# Patient Record
Sex: FEMALE | Race: BLACK OR AFRICAN AMERICAN | Employment: UNEMPLOYED | URBAN - METROPOLITAN AREA
[De-identification: names, ages, dates, MRNs, and addresses within clinical notes are randomized per-mention and may not be internally consistent; named-entity substitution may affect disease eponyms.]

---

## 2022-11-08 ENCOUNTER — APPOINTMENT (OUTPATIENT)
Dept: GENERAL RADIOLOGY | Age: 32
End: 2022-11-08
Payer: COMMERCIAL

## 2022-11-08 ENCOUNTER — HOSPITAL ENCOUNTER (EMERGENCY)
Age: 32
Discharge: HOME | End: 2022-11-08
Attending: EMERGENCY MEDICINE
Payer: COMMERCIAL

## 2022-11-08 VITALS
WEIGHT: 260 LBS | OXYGEN SATURATION: 98 % | SYSTOLIC BLOOD PRESSURE: 117 MMHG | HEIGHT: 64 IN | RESPIRATION RATE: 17 BRPM | HEART RATE: 90 BPM | BODY MASS INDEX: 44.39 KG/M2 | TEMPERATURE: 98.2 F | DIASTOLIC BLOOD PRESSURE: 79 MMHG

## 2022-11-08 DIAGNOSIS — T73.3XXA FATIGUE DUE TO EXCESSIVE EXERTION, INITIAL ENCOUNTER: Primary | ICD-10-CM

## 2022-11-08 LAB
ABSOLUTE EOS #: 0.23 K/UL (ref 0–0.44)
ABSOLUTE IMMATURE GRANULOCYTE: 0 K/UL (ref 0–0.3)
ABSOLUTE LYMPH #: 1.62 K/UL (ref 1.1–3.7)
ABSOLUTE MONO #: 0.38 K/UL (ref 0.1–1.2)
ANION GAP SERPL CALCULATED.3IONS-SCNC: 9 MMOL/L (ref 9–17)
BACTERIA: ABNORMAL
BASOPHILS # BLD: 1 % (ref 0–2)
BASOPHILS ABSOLUTE: <0.03 K/UL (ref 0–0.2)
BILIRUBIN URINE: NEGATIVE
BUN BLDV-MCNC: 6 MG/DL (ref 6–20)
BUN/CREAT BLD: 11 (ref 9–20)
CALCIUM SERPL-MCNC: 8.7 MG/DL (ref 8.6–10.4)
CHLORIDE BLD-SCNC: 102 MMOL/L (ref 98–107)
CO2: 25 MMOL/L (ref 20–31)
COLOR: YELLOW
CREAT SERPL-MCNC: 0.57 MG/DL (ref 0.5–0.9)
EKG ATRIAL RATE: 88 BPM
EKG P AXIS: 43 DEGREES
EKG P-R INTERVAL: 164 MS
EKG Q-T INTERVAL: 370 MS
EKG QRS DURATION: 80 MS
EKG QTC CALCULATION (BAZETT): 447 MS
EKG R AXIS: 27 DEGREES
EKG T AXIS: 20 DEGREES
EKG VENTRICULAR RATE: 88 BPM
EOSINOPHILS RELATIVE PERCENT: 7 % (ref 1–4)
EPITHELIAL CELLS UA: ABNORMAL /HPF (ref 0–5)
GFR SERPL CREATININE-BSD FRML MDRD: >60 ML/MIN/1.73M2
GLUCOSE BLD-MCNC: 89 MG/DL (ref 70–99)
GLUCOSE URINE: NEGATIVE
HCT VFR BLD CALC: 41.3 % (ref 36.3–47.1)
HEMOGLOBIN: 12.5 G/DL (ref 11.9–15.1)
IMMATURE GRANULOCYTES: 0 %
KETONES, URINE: NEGATIVE
LEUKOCYTE ESTERASE, URINE: NEGATIVE
LYMPHOCYTES # BLD: 45 % (ref 24–43)
MCH RBC QN AUTO: 25.8 PG (ref 25.2–33.5)
MCHC RBC AUTO-ENTMCNC: 30.3 G/DL (ref 28.4–34.8)
MCV RBC AUTO: 85.2 FL (ref 82.6–102.9)
MONOCYTES # BLD: 11 % (ref 3–12)
NITRITE, URINE: NEGATIVE
NRBC AUTOMATED: 0 PER 100 WBC
PDW BLD-RTO: 15.2 % (ref 11.8–14.4)
PH UA: 7 (ref 5–8)
PLATELET # BLD: 381 K/UL (ref 138–453)
PMV BLD AUTO: 10.6 FL (ref 8.1–13.5)
POTASSIUM SERPL-SCNC: 3.9 MMOL/L (ref 3.7–5.3)
PROTEIN UA: ABNORMAL
RBC # BLD: 4.85 M/UL (ref 3.95–5.11)
RBC # BLD: ABNORMAL 10*6/UL
RBC UA: ABNORMAL /HPF (ref 0–2)
REASON FOR REJECTION: NORMAL
SARS-COV-2, RAPID: NOT DETECTED
SEG NEUTROPHILS: 36 % (ref 36–65)
SEGMENTED NEUTROPHILS ABSOLUTE COUNT: 1.28 K/UL (ref 1.5–8.1)
SODIUM BLD-SCNC: 136 MMOL/L (ref 135–144)
SPECIFIC GRAVITY UA: 1.02 (ref 1–1.03)
SPECIMEN DESCRIPTION: NORMAL
TROPONIN, HIGH SENSITIVITY: <6 NG/L (ref 0–14)
TURBIDITY: CLEAR
URINE HGB: ABNORMAL
UROBILINOGEN, URINE: NORMAL
WBC # BLD: 3.5 K/UL (ref 3.5–11.3)
WBC UA: ABNORMAL /HPF (ref 0–5)
ZZ NTE CLEAN UP: ORDERED TEST: NORMAL
ZZ NTE WITH NAME CLEAN UP: SPECIMEN SOURCE: NORMAL

## 2022-11-08 PROCEDURE — 84484 ASSAY OF TROPONIN QUANT: CPT

## 2022-11-08 PROCEDURE — 87635 SARS-COV-2 COVID-19 AMP PRB: CPT

## 2022-11-08 PROCEDURE — 85025 COMPLETE CBC W/AUTO DIFF WBC: CPT

## 2022-11-08 PROCEDURE — 80048 BASIC METABOLIC PNL TOTAL CA: CPT

## 2022-11-08 PROCEDURE — 2580000003 HC RX 258: Performed by: EMERGENCY MEDICINE

## 2022-11-08 PROCEDURE — 71045 X-RAY EXAM CHEST 1 VIEW: CPT

## 2022-11-08 PROCEDURE — 81001 URINALYSIS AUTO W/SCOPE: CPT

## 2022-11-08 PROCEDURE — 93005 ELECTROCARDIOGRAM TRACING: CPT | Performed by: STUDENT IN AN ORGANIZED HEALTH CARE EDUCATION/TRAINING PROGRAM

## 2022-11-08 PROCEDURE — 99285 EMERGENCY DEPT VISIT HI MDM: CPT

## 2022-11-08 RX ORDER — 0.9 % SODIUM CHLORIDE 0.9 %
500 INTRAVENOUS SOLUTION INTRAVENOUS ONCE
Status: COMPLETED | OUTPATIENT
Start: 2022-11-08 | End: 2022-11-08

## 2022-11-08 RX ORDER — PREDNISONE 20 MG/1
TABLET ORAL
Qty: 14 TABLET | Refills: 0 | Status: SHIPPED | OUTPATIENT
Start: 2022-11-08 | End: 2022-11-20

## 2022-11-08 RX ADMIN — SODIUM CHLORIDE 500 ML: 9 INJECTION, SOLUTION INTRAVENOUS at 12:36

## 2022-11-08 ASSESSMENT — ENCOUNTER SYMPTOMS
DIARRHEA: 0
SHORTNESS OF BREATH: 1
ABDOMINAL PAIN: 0
COUGH: 1
SORE THROAT: 1
NAUSEA: 0
RHINORRHEA: 1

## 2022-11-08 ASSESSMENT — PAIN - FUNCTIONAL ASSESSMENT: PAIN_FUNCTIONAL_ASSESSMENT: 0-10

## 2022-11-08 ASSESSMENT — PAIN DESCRIPTION - LOCATION: LOCATION: GENERALIZED

## 2022-11-08 NOTE — ED NOTES
Writer [facility] iv fluids running      [facility]  11/08/22 9773

## 2022-11-08 NOTE — ED PROVIDER NOTES
[facility] ED  Emergency Department Encounter  Resident     Pt Candace Jeter  MRN: [id number]  [place] 1990  Date of evaluation: 11/8/22  PCP:  No primary care provider on file. CHIEF COMPLAINT       Chief Complaint   Patient presents with    Lupus     Pt states that she is having a lupus flare up. States that she knows when she is going to have a flare        HISTORY OF PRESENT ILLNESS  (Location/Symptom, Timing/Onset, Context/Setting, Quality, Duration, Modifying Factors, Severity.)      Stephanie Roldan is a 27 y.o. female who presents with with complaint of fatigue, fever (102 at home), chills, runny nose, sore throat, cough productive of yellow sputum. Denies dizziness, headache, chest pain, nausea, vomiting, diarrhea or change in urine. Patient has history of SLE, sjogren syndrome and venous sinus thrombosis. On plaqenil, cellcept and aspirin. She has not been following up with doctors for over a year, she has an appointment with a new pcp next week. PAST MEDICAL / SURGICAL / SOCIAL / FAMILY HISTORY      has no past medical history on file. has no past surgical history on file.     Social History     Socioeconomic History    Marital status: Single     Spouse name: Not on file    Number of children: Not on file    Years of education: Not on file    Highest education level: Not on file   Occupational History    Not on file   Tobacco Use    Smoking status: Not on file    Smokeless tobacco: Not on file   Substance and Sexual Activity    Alcohol use: Not on file    Drug use: Not on file    Sexual activity: Not on file   Other Topics Concern    Not on file   Social History Narrative    Not on file     Social Determinants of Health     Financial Resource Strain: Not on file   Food Insecurity: Not on file   Transportation Needs: Not on file   Physical Activity: Not on file   Stress: Not on file   Social Connections: Not on file   Intimate Partner Violence: Not on file   Housing Stability: Not on file       No family history on file. Allergies:  Codeine    Home Medications:  Prior to Admission medications    Medication Sig Start Date End Date Taking? Authorizing Provider   predniSONE (DELTASONE) 20 MG tablet Take 2 tablets by mouth daily for 4 days, THEN 1 tablet daily for 4 days, THEN 0.5 tablets daily for 4 days. 11/8/22 11/20/22 Yes Carlitos Shook MD       REVIEW OF SYSTEMS    (2-9 systems for level 4, 10 or more for level 5)      Review of Systems   Constitutional:  Positive for activity change, chills, fatigue and fever. HENT:  Positive for rhinorrhea and sore throat. Respiratory:  Positive for cough and shortness of breath. Cardiovascular:  Negative for chest pain and palpitations. Gastrointestinal:  Negative for abdominal pain, diarrhea and nausea. Genitourinary:  Negative for difficulty urinating. Musculoskeletal:  Positive for arthralgias. Neurological:  Negative for dizziness, weakness, light-headedness and numbness. PHYSICAL EXAM   (up to 7 for level 4, 8 or more for level 5)      INITIAL VITALS:   /79   Pulse 90   Temp 98.2 °F (36.8 °C) (Oral)   Resp 17   Ht 5' 4\" (1.626 m)   Wt 260 lb (117.9 kg)   LMP 10/08/2022   SpO2 98%   BMI 44.63 kg/m²     Physical Exam  Vitals and nursing note reviewed. Constitutional:       General: She is not in acute distress. Appearance: Normal appearance. She is not ill-appearing. HENT:      Head: Normocephalic and atraumatic. Mouth/Throat:      Pharynx: Oropharynx is clear. No oropharyngeal exudate. Cardiovascular:      Rate and Rhythm: Normal rate and regular rhythm. Pulses: Normal pulses. Heart sounds: No murmur heard. Pulmonary:      Effort: Pulmonary effort is normal.      Breath sounds: Normal breath sounds. Abdominal:      Palpations: Abdomen is soft. There is no mass. Tenderness: There is no abdominal tenderness. Neurological:      General: No focal deficit present. Mental Status: She is alert and oriented to person, place, and time. PLAN (LABS / IMAGING / EKG):  Orders Placed This Encounter   Procedures    COVID-19, Rapid    XR CHEST PORTABLE    CBC with Auto Differential    Urinalysis    SPECIMEN REJECTION    Basic Metabolic Panel    PREVIOUS SPECIMEN    Troponin    Microscopic Urinalysis    EKG 12 Lead       MEDICATIONS ORDERED:  Orders Placed This Encounter   Medications    0.9 % sodium chloride bolus    predniSONE (DELTASONE) 20 MG tablet     Sig: Take 2 tablets by mouth daily for 4 days, THEN 1 tablet daily for 4 days, THEN 0.5 tablets daily for 4 days. Dispense:  14 tablet     Refill:  0         DIAGNOSTIC RESULTS / EMERGENCY DEPARTMENT COURSE / MDM   :  Results for orders placed or performed during the hospital encounter of 11/08/22   COVID-19, Rapid    Specimen: Nasopharyngeal Swab   Result Value Ref Range    Specimen Description . NASOPHARYNGEAL SWAB     SARS-CoV-2, Rapid Not Detected Not Detected   CBC with Auto Differential   Result Value Ref Range    WBC 3.5 3.5 - 11.3 k/uL    RBC 4.85 3.95 - 5.11 m/uL    Hemoglobin 12.5 11.9 - 15.1 g/dL    Hematocrit 41.3 36.3 - 47.1 %    MCV 85.2 82.6 - 102.9 fL    MCH 25.8 25.2 - 33.5 pg    MCHC 30.3 28.4 - 34.8 g/dL    RDW 15.2 (H) 11.8 - 14.4 %    Platelets 678 353 - 751 k/uL    MPV 10.6 8.1 - 13.5 fL    NRBC Automated 0.0 0.0 per 100 WBC    Seg Neutrophils 36 36 - 65 %    Lymphocytes 45 (H) 24 - 43 %    Monocytes 11 3 - 12 %    Eosinophils % 7 (H) 1 - 4 %    Basophils 1 0 - 2 %    Immature Granulocytes 0 0 %    Segs Absolute 1.28 (L) 1.50 - 8.10 k/uL    Absolute Lymph # 1.62 1.10 - 3.70 k/uL    Absolute Mono # 0.38 0.10 - 1.20 k/uL    Absolute Eos # 0.23 0.00 - 0.44 k/uL    Basophils Absolute <0.03 0.00 - 0.20 k/uL    Absolute Immature Granulocyte 0.00 0.00 - 0.30 k/uL    RBC Morphology ANISOCYTOSIS PRESENT    Urinalysis   Result Value Ref Range    Color, UA Yellow Yellow    Turbidity UA Clear Clear    Glucose, Ur NEGATIVE NEGATIVE    Bilirubin Urine NEGATIVE NEGATIVE    Ketones, Urine NEGATIVE NEGATIVE    Specific Gravity, UA 1.025 1.005 - 1.030    Urine Hgb 1+ (A) NEGATIVE    pH, UA 7.0 5.0 - 8.0    Protein, UA 3+ (A) NEGATIVE    Urobilinogen, Urine Normal Normal    Nitrite, Urine NEGATIVE NEGATIVE    Leukocyte Esterase, Urine NEGATIVE NEGATIVE   SPECIMEN REJECTION   Result Value Ref Range    Specimen Source . BLOOD     Ordered Test BMP,TROPI     Reason for Rejection Unable to perform testing: Specimen hemolyzed. Basic Metabolic Panel   Result Value Ref Range    Glucose 89 70 - 99 mg/dL    BUN 6 6 - 20 mg/dL    Creatinine 0.57 0.50 - 0.90 mg/dL    Est, Glom Filt Rate >60 >60 mL/min/1.73m2    Bun/Cre Ratio 11 9 - 20    Calcium 8.7 8.6 - 10.4 mg/dL    Sodium 136 135 - 144 mmol/L    Potassium 3.9 3.7 - 5.3 mmol/L    Chloride 102 98 - 107 mmol/L    CO2 25 20 - 31 mmol/L    Anion Gap 9 9 - 17 mmol/L   Troponin   Result Value Ref Range    Troponin, High Sensitivity <6 0 - 14 ng/L   Microscopic Urinalysis   Result Value Ref Range    WBC, UA None 0 - 5 /HPF    RBC, UA 2 TO 5 0 - 2 /HPF    Epithelial Cells UA 2 TO 5 0 - 5 /HPF    Bacteria, UA RARE (A) None   EKG 12 Lead   Result Value Ref Range    Ventricular Rate 88 BPM    Atrial Rate 88 BPM    P-R Interval 164 ms    QRS Duration 80 ms    Q-T Interval 370 ms    QTc Calculation (Bazett) 447 ms    P Axis 43 degrees    R Axis 27 degrees    T Axis 20 degrees       IMPRESSION:  27year-old female ith PMHx of SLE, Sjogren syndrome, venous sinus thrombosis who presents with fatigue, rhinorrhea, chills, cough and sore throat. Covid test was negative. CXR unremarkable. Workup unremarkable. Patient improved after 500ml bolus of IV. Discharged patient on steroid taper with close follow up with PCP.        RADIOLOGY:  None    EKG  None    All EKG's are interpreted by the Emergency Department Physician who either signs or Co-signs this chart in the absence of a cardiologist.    EMERGENCY DEPARTMENT COURSE:         PROCEDURES:  None    CONSULTS:  None    CRITICAL CARE:  None    FINAL IMPRESSION      1. Fatigue due to excessive exertion, initial encounter          DISPOSITION / PLAN     DISPOSITION Decision To Discharge 11/08/2022 02:12:21 PM      PATIENT REFERRED TO:  follow-up with PCP. DISCHARGE MEDICATIONS:  Discharge Medication List as of 11/8/2022  2:02 PM        START taking these medications    Details   predniSONE (DELTASONE) 20 MG tablet Take 2 tablets by mouth daily for 4 days, THEN 1 tablet daily for 4 days, THEN 0.5 tablets daily for 4 days. , Disp-14 tablet, R-0Normal             Melissa Lang MD  PGY3 Family Medicine Resident    (Please note that portions of thisnote were completed with a voice recognition program.  Efforts were made to edit the dictations but occasionally words are mis-transcribed.)

## 2022-11-08 NOTE — ED NOTES
Patient up to use the bathroom at this time  Gait is steady and even     Davida Mercado RN  11/08/22 1893

## 2022-11-08 NOTE — ED PROVIDER NOTES
EMERGENCY DEPARTMENT ENCOUNTER   ATTENDING ATTESTATION     Pt Name: Dannielle Mann  MRN: [id number]  [place] 1990  Date of evaluation: 11/8/22       Dannielle Mann is a 27 y.o. female who presents with Lupus (Pt states that she is having a lupus flare up. States that she knows when she is going to have a flare )      MDM:   Alert oriented nondistressed 20-year-old female presenting to the emergency room for generalized fatigue malaise and body aches. Patient has overall unremarkable physical exam.  She has normal SPO2 normal cardiopulmonary exam.  Labs are within acceptable limits. Patient did request IV fluids and plan is for steroid taper for home for possible lupus exacerbation. Vitals:   Vitals:    11/08/22 0942   BP: (!) 146/90   Pulse: (!) 103   Resp: 18   Temp: 98.2 °F (36.8 °C)   TempSrc: Oral   SpO2: 98%   Weight: 260 lb (117.9 kg)   Height: 5' 4\" (1.626 m)     EKG sinus rhythm  Motion artifact present affecting interpretation  Ventricular rate 88  No STEMI criteria    QTc 447    I personally saw and examined the patient. I have reviewed and agree with the resident's findings, including all diagnostic interpretations and treatment plan as written. I was present for the key portions of any procedures performed and the inclusive time noted for any critical care statement. The care is provided during an unprecedented national emergency due to the novel coronavirus, COVID 19.   Delmer Iyer MD  Attending Emergency Physician           Keara Smith MD  11/08/22 1138       Keara Smith MD  11/08/22 1229       Keara Smith MD  11/08/22 1233

## 2022-11-16 ENCOUNTER — OFFICE VISIT (OUTPATIENT)
Dept: FAMILY MEDICINE CLINIC | Age: 32
End: 2022-11-16
Payer: COMMERCIAL

## 2022-11-16 VITALS
SYSTOLIC BLOOD PRESSURE: 144 MMHG | OXYGEN SATURATION: 99 % | HEART RATE: 77 BPM | DIASTOLIC BLOOD PRESSURE: 103 MMHG | BODY MASS INDEX: 45.38 KG/M2 | WEIGHT: 264.4 LBS

## 2022-11-16 DIAGNOSIS — Z76.0 MEDICATION REFILL: ICD-10-CM

## 2022-11-16 DIAGNOSIS — G89.29 CHRONIC MIDLINE LOW BACK PAIN WITHOUT SCIATICA: ICD-10-CM

## 2022-11-16 DIAGNOSIS — F43.10 PTSD (POST-TRAUMATIC STRESS DISORDER): ICD-10-CM

## 2022-11-16 DIAGNOSIS — E55.9 VITAMIN D DEFICIENCY: ICD-10-CM

## 2022-11-16 DIAGNOSIS — M54.50 CHRONIC MIDLINE LOW BACK PAIN WITHOUT SCIATICA: ICD-10-CM

## 2022-11-16 DIAGNOSIS — M35.02 SJOGREN'S SYNDROME WITH LUNG INVOLVEMENT (HCC): ICD-10-CM

## 2022-11-16 DIAGNOSIS — Z76.89 ENCOUNTER TO ESTABLISH CARE: Primary | ICD-10-CM

## 2022-11-16 DIAGNOSIS — R53.83 OTHER FATIGUE: ICD-10-CM

## 2022-11-16 DIAGNOSIS — M32.9 SLE (SYSTEMIC LUPUS ERYTHEMATOSUS RELATED SYNDROME) (HCC): ICD-10-CM

## 2022-11-16 DIAGNOSIS — M79.7 FIBROMYALGIA: ICD-10-CM

## 2022-11-16 PROBLEM — R56.9 SEIZURES (HCC): Status: ACTIVE | Noted: 2020-12-09

## 2022-11-16 PROBLEM — G93.9 LESION OF LEFT FRONTAL LOBE OF BRAIN: Status: ACTIVE | Noted: 2020-12-06

## 2022-11-16 PROBLEM — D63.8 ANEMIA OF CHRONIC DISEASE: Status: ACTIVE | Noted: 2020-12-09

## 2022-11-16 PROBLEM — G08 DURAL VENOUS SINUS THROMBOSIS: Status: ACTIVE | Noted: 2020-12-11

## 2022-11-16 PROCEDURE — G8427 DOCREV CUR MEDS BY ELIG CLIN: HCPCS | Performed by: NURSE PRACTITIONER

## 2022-11-16 PROCEDURE — G8417 CALC BMI ABV UP PARAM F/U: HCPCS | Performed by: NURSE PRACTITIONER

## 2022-11-16 PROCEDURE — 99204 OFFICE O/P NEW MOD 45 MIN: CPT | Performed by: NURSE PRACTITIONER

## 2022-11-16 PROCEDURE — 4004F PT TOBACCO SCREEN RCVD TLK: CPT | Performed by: NURSE PRACTITIONER

## 2022-11-16 PROCEDURE — G8484 FLU IMMUNIZE NO ADMIN: HCPCS | Performed by: NURSE PRACTITIONER

## 2022-11-16 RX ORDER — FERROUS SULFATE 324(65)MG
TABLET, DELAYED RELEASE (ENTERIC COATED) ORAL
COMMUNITY
Start: 2022-03-24 | End: 2022-11-16 | Stop reason: SDUPTHER

## 2022-11-16 RX ORDER — LEVETIRACETAM 750 MG/1
750 TABLET ORAL 2 TIMES DAILY
COMMUNITY
Start: 2021-01-15

## 2022-11-16 RX ORDER — HYDROXYCHLOROQUINE SULFATE 200 MG/1
200 TABLET, FILM COATED ORAL 2 TIMES DAILY
Qty: 60 TABLET | Refills: 1 | Status: SHIPPED | OUTPATIENT
Start: 2022-11-16

## 2022-11-16 RX ORDER — HYDROXYCHLOROQUINE SULFATE 200 MG/1
400 TABLET, FILM COATED ORAL DAILY
COMMUNITY
Start: 2021-03-01 | End: 2022-11-16 | Stop reason: SDUPTHER

## 2022-11-16 RX ORDER — CETIRIZINE HYDROCHLORIDE 10 MG/1
10 TABLET ORAL DAILY PRN
Qty: 90 TABLET | Refills: 1 | Status: SHIPPED | OUTPATIENT
Start: 2022-11-16

## 2022-11-16 RX ORDER — FERROUS SULFATE 324(65)MG
TABLET, DELAYED RELEASE (ENTERIC COATED) ORAL
Qty: 90 TABLET | Refills: 1 | Status: SHIPPED | OUTPATIENT
Start: 2022-11-16

## 2022-11-16 RX ORDER — LOSARTAN POTASSIUM 25 MG/1
25 TABLET ORAL DAILY
Qty: 90 TABLET | Refills: 1 | Status: SHIPPED | OUTPATIENT
Start: 2022-11-16

## 2022-11-16 RX ORDER — LOSARTAN POTASSIUM 25 MG/1
25 TABLET ORAL DAILY
COMMUNITY
Start: 2021-04-27 | End: 2022-11-16 | Stop reason: SDUPTHER

## 2022-11-16 RX ORDER — MYCOPHENOLATE MOFETIL 500 MG/1
1000 TABLET ORAL 3 TIMES DAILY
COMMUNITY
Start: 2021-11-23 | End: 2022-11-23

## 2022-11-16 RX ORDER — CETIRIZINE HYDROCHLORIDE 10 MG/1
10 TABLET ORAL DAILY PRN
COMMUNITY
Start: 2021-11-11 | End: 2022-11-16 | Stop reason: SDUPTHER

## 2022-11-16 RX ORDER — PREDNISONE 10 MG/1
10 TABLET ORAL DAILY
Qty: 5 TABLET | Refills: 0 | Status: SHIPPED | OUTPATIENT
Start: 2022-11-16 | End: 2022-11-21

## 2022-11-16 SDOH — ECONOMIC STABILITY: FOOD INSECURITY: WITHIN THE PAST 12 MONTHS, THE FOOD YOU BOUGHT JUST DIDN'T LAST AND YOU DIDN'T HAVE MONEY TO GET MORE.: NEVER TRUE

## 2022-11-16 SDOH — ECONOMIC STABILITY: FOOD INSECURITY: WITHIN THE PAST 12 MONTHS, YOU WORRIED THAT YOUR FOOD WOULD RUN OUT BEFORE YOU GOT MONEY TO BUY MORE.: NEVER TRUE

## 2022-11-16 ASSESSMENT — ENCOUNTER SYMPTOMS
SINUS PRESSURE: 0
SHORTNESS OF BREATH: 0
CHEST TIGHTNESS: 0
ABDOMINAL PAIN: 0
SINUS PAIN: 0
COLOR CHANGE: 0
CONSTIPATION: 0
DIARRHEA: 0

## 2022-11-16 ASSESSMENT — SOCIAL DETERMINANTS OF HEALTH (SDOH): HOW HARD IS IT FOR YOU TO PAY FOR THE VERY BASICS LIKE FOOD, HOUSING, MEDICAL CARE, AND HEATING?: NOT HARD AT ALL

## 2022-11-16 ASSESSMENT — PATIENT HEALTH QUESTIONNAIRE - PHQ9
SUM OF ALL RESPONSES TO PHQ QUESTIONS 1-9: 0
SUM OF ALL RESPONSES TO PHQ QUESTIONS 1-9: 0
2. FEELING DOWN, DEPRESSED OR HOPELESS: 0
SUM OF ALL RESPONSES TO PHQ QUESTIONS 1-9: 0
1. LITTLE INTEREST OR PLEASURE IN DOING THINGS: 0
SUM OF ALL RESPONSES TO PHQ QUESTIONS 1-9: 0
SUM OF ALL RESPONSES TO PHQ9 QUESTIONS 1 & 2: 0

## 2022-11-16 NOTE — PROGRESS NOTES
Jerry Austin is a 27 y.o. female who presents in office today with Self establish new care with office. Previous PCP was Dr Tarsha Hdez @ [address] Complaint   Patient presents with    Establish Care        History of Present Illness:     HPI    Here to establish care. Has lupus and Sjogrens syndrome, established with Luke Hernade[facility]. Had CVA in , lifeflighted to [facility]. Had lesion on blood clot on brain. Has been in trauma therapy since , PTSD. Prefers to remain off medication if able to manage with therapy. Just assigned to new therapist through [facility]. Therapy \"the best thing she's ever done for herself. \"  Had been carrying a lot of \"heavy things. \" Estranged from family, had toxic relationship with parents. Very supportive relationship with wife. Has  also. Blood pressure elevated today. Reports did take losartan today. Attributes to anxiety with new office. BP normal at ER 22 - 117/79. Seen at [facility] for fatigue. Feeling better but wondering if needing a few additional days of prednisone taper as she is just beginning to feel herself again. Would like referral to physical therapy. Most pain in legs and lower back. Aware that weight is contributing. Has been intermittent fasting with 8 hour eating window.        Specialists - needing new referrals  Rheumatology - Roxanna Osorio - 3/30/21  Ophthalmology  Dermatology - [facility]  Nephrology Jenniffer Loredo - 21  Neurology - Aissatou Ott - 3/8/22  Sanjay Moya - 21  Pulmonology - [facility] Dr Arleth Richardson - 3/18/21      Care gaps: COVID-19 vaccine:   no  Colon CA screening:   n/a  Vaccines:   due   Hepatitis C/HIV screens:   ,   Breast CA screening:   n/a  OB/GYN, cervical CA screening:    due - plans to schedule   Depression Screenin    Health Maintenance Due   Topic Date Due    COVID-19 Vaccine (1) Never done    Varicella vaccine (1 of 2 - 2-dose childhood series) Never done    Pneumococcal 0-64 years Vaccine (1 - PCV) Never done    DTaP/Tdap/Td vaccine (1 - Tdap) Never done    Cervical cancer screen  Never done    Flu vaccine (1) Never done        Patient Care Team:  SWATI Guardado - CNP as PCP - General (Nurse Practitioner)    Reviewed     [x] Past Medical, Family, and Social History was reviewed per writer and does contribute to the patient presenting condition. [x] Laboratory Results, Vital signs, Imaging, Active Problems, Immunizations, Current/Recently Discontinued Medications, Health Maintenance Activities Due, Referral Notes (if available) were reviewed per writer     [x] Reviewed Depression screening if taken or valid today or any other valid screening tool (others seen below) Interpretation of Total Score DepressionSeverity: 1-4 = Minimal depression, 5-9 = Mild depression, 10-14 = Moderate depression, 15-19 = Moderately severe depression, 20-27 =Severe depression    PHQ Scores 11/16/2022   PHQ2 Score 0   PHQ9 Score 0     Interpretation of Total Score Depression Severity: 1-4 = Minimal depression, 5-9 = Mild depression, 10-14 = Moderate depression, 15-19 = Moderately severe depression, 20-27 = Severe depression     Review of Systems (Subjective)     Review of Systems   Constitutional:  Positive for fatigue. Negative for activity change, appetite change and unexpected weight change. HENT:  Negative for congestion, sinus pressure and sinus pain. Respiratory:  Negative for chest tightness and shortness of breath. Cardiovascular:  Negative for chest pain, palpitations and leg swelling. Gastrointestinal:  Negative for abdominal pain, constipation and diarrhea. Genitourinary:  Negative for difficulty urinating and dysuria. Musculoskeletal:  Negative for arthralgias and myalgias. Skin:  Negative for color change and rash. Neurological:  Negative for dizziness, numbness and headaches.    Psychiatric/Behavioral:  Negative for dysphoric mood and sleep disturbance. The patient is not nervous/anxious. See HPI     Physical Assessment (Objective)     BP (!) 155/114   Pulse 85   Wt 264 lb 6.4 oz (119.9 kg)   SpO2 99%   BMI 45.38 kg/m²      Physical Exam  Vitals reviewed. Constitutional:       General: She is not in acute distress. Appearance: She is obese. HENT:      Head: Normocephalic and atraumatic. Right Ear: External ear normal.      Left Ear: External ear normal.      Nose: Nose normal.      Mouth/Throat:      Mouth: Mucous membranes are moist.      Pharynx: Oropharynx is clear. Eyes:      Extraocular Movements: Extraocular movements intact. Conjunctiva/sclera: Conjunctivae normal.      Pupils: Pupils are equal, round, and reactive to light. Cardiovascular:      Rate and Rhythm: Normal rate and regular rhythm. Pulses: Normal pulses. Heart sounds: Normal heart sounds. No murmur heard. Pulmonary:      Effort: Pulmonary effort is normal. No respiratory distress. Breath sounds: Normal breath sounds. Abdominal:      General: Bowel sounds are normal.      Palpations: Abdomen is soft. Musculoskeletal:         General: No swelling. Normal range of motion. Cervical back: Normal range of motion and neck supple. Skin:     General: Skin is warm and dry. Capillary Refill: Capillary refill takes less than 2 seconds. Neurological:      Mental Status: She is alert and oriented to person, place, and time. Motor: No weakness. Gait: Gait normal.   Psychiatric:         Mood and Affect: Mood normal.         Behavior: Behavior normal.         Thought Content: Thought content normal.         Judgment: Judgment normal.       Diagnoses / Plan:   1. Encounter to establish care  -     vitamin D (CHOLECALCIFEROL) 24797 UNIT CAPS; TAKE 1 CAPSULE BY MOUTH ONE TIME PER WEEK, Disp-1 capsule, R-3Normal  -     cetirizine (ZYRTEC) 10 MG tablet;  Take 1 tablet by mouth daily as needed for Allergies, Disp-90 tablet, R-1Normal  -     Ferrous Sulfate 324 MG TBEC; TAKE 1 TABLET BY MOUTH EVERY DAY, Disp-90 tablet, R-1Normal  -     losartan (COZAAR) 25 MG tablet; Take 1 tablet by mouth daily, Disp-90 tablet, R-1Normal  2. PTSD (post-traumatic stress disorder)  Comments[facility]  3. Sjogren's syndrome with lung involvement ([facility]  -     [facility]  -     hydroxychloroquine (PLAQUENIL) 200 MG tablet; Take 1 tablet by mouth 2 times daily, Disp-60 tablet, R-1Normal  -     Kiran Méndez DO, Cardiology, Tiff Zamudio MD, Neurology, Jacqueline Mayen MD, Dermatology, [address] [place] [address], Rheumatology, [facility], [facility] ([address]) - Huy Diaz MD, Nephrology, [place]  4. [facility], [address], DO, Cardiology, Tiff Zamudio MD, Neurology, Jacqueline Mayen MD, Dermatology, [address], Brenda , DO, Rheumatology, [place]  5. Medication refill  -     hydroxychloroquine (PLAQUENIL) 200 MG tablet; Take 1 tablet by mouth 2 times daily, Disp-60 tablet, R-1Normal  6. Chronic midline low back pain without sciatica  -     [facility] - [facility]  7. SLE (systemic lupus erythematosus related syndrome) ([facility]  -     Kiran Méndez DO, Cardiology, Tiff Zamudio MD, Neurology, Jacqueline Mayen MD, Dermatology, [address] [place] [address], Rheumatology, [facility] (Marianela Calvo MD, Nephrology, [place]  -     predniSONE (DELTASONE) 10 MG tablet; Take 1 tablet by mouth daily for 5 days, Disp-5 tablet, R-0Normal  8. Vitamin D deficiency  -     Vitamin D 25 Hydroxy; Future  9. Other fatigue  -     predniSONE (DELTASONE) 10 MG tablet;  Take 1 tablet by mouth daily for 5 days, Disp-5 tablet, R-0Normal     Will refill hydroxychloroquin pending new referral to rheumatology. Schedule with all specialists. New referrals and contact information provided. Continue following with trauma therapy. Understanding and agreement was voiced with all above plans. All questions answered to satisfaction. Encouraged healthy diet and exercise. Call office with any questions or new or worsening symptoms or concerns. Return in about 6 weeks (around 12/28/2022), or if symptoms worsen or fail to improve, for chronic conditions. Electronically signed by SWATI Morgan CNP on 11/16/2022 at 2:22 PM    Note is dictated utilizing voice recognition software. Unfortunately this leads to occasional typographical errors. Please contact our office if you have any questions.

## 2022-11-29 NOTE — FLOWSHEET NOTE
[] [facility]) - [facility] &  Therapy  [address]    [phone]  F: [phone]   [x] [address]  [facility] [address]   [address]  P: [phone]  F: [phone]  [] [address] &  Therapy  [address]  P: [phone]  F: [phone]   [] [address]  P: [phone]  F: [phone]  [] [address]  [address] [facility] [address]  [place]: [phone]   F: [phone] [] [facility] &  Therapy  [facility] [address]   [place]: [phone]  F: [phone]         Physical Therapy Cancel/No Show note     Date: 22   Patient: Kaitlin Reeves                        :  1990                               MRN: [id number]     Cancels/No Shows to date:      For today's appointment patient:    [x]  Cancelled    [x] Rescheduled appointment    [] No-show     Reason given by patient:    []  Patient ill    []  Conflicting appointment    [] No transportation                 [] Conflict with work    [x] No reason given    [] Weather related    [] COVID-19    [x] Other:                 Comments: Rescheduled to 22 for initial evaluation.         [x] Next appointment was confirmed     Electronically signed by: Thierno Carvalho PT

## 2022-11-30 ENCOUNTER — HOSPITAL ENCOUNTER (OUTPATIENT)
Dept: PHYSICAL THERAPY | Facility: CLINIC | Age: 32
Setting detail: THERAPIES SERIES
Discharge: HOME | End: 2022-11-30

## 2022-12-05 ENCOUNTER — HOSPITAL ENCOUNTER (OUTPATIENT)
Dept: PHYSICAL THERAPY | Facility: CLINIC | Age: 32
Setting detail: THERAPIES SERIES
Discharge: HOME | End: 2022-12-05
Payer: COMMERCIAL

## 2022-12-05 PROCEDURE — 97162 PT EVAL MOD COMPLEX 30 MIN: CPT

## 2022-12-05 NOTE — CONSULTS
[] [facility]) - [facility] &  Therapy  [address]  [phone]  F: [phone] [x] [address]  [facility]\"" 36   [address]  P: [phone]  F: [phone] [] [address] &  Therapy  [address]  P: [phone]  F: [phone] [] [address]  P: [phone]  F: [phone] [] [address]  [facility]   [address]   [place]: [phone]  F: [phone]      Physical Therapy Spine Evaluation    Date:  2022  Patient: Haley Winter  : 1990  MRN: [id number]  Physician: ANA Sierra     Insurance: [employer]  Medical Diagnosis: Sjogren's syndrome with lung involvement; chronic midline LBP without sciatica    Rehab Codes: M54.59; M25.60; M79.1; R29.3; M62.81  Onset Date: 2020    Next 's appt.: 22    Subjective:   CC: increased pain in chest, fibromyalgia, lower back pain with the weather change in the Fall. Pt feels back pain is weight related. Pt had a spinal tap  and since then has had back pain. Wants to eventually have a baby and go back to work. Pt wants to lose weight. Pt is afraid of overdoing it because of a flare-up, headaches, body ahces, chills and she needs prednisone for treatment. HPI: (onset date) pt had stroke 2 years ago and was diagnosed with lupus at that time. Pt was diagnosed with fibromyalgia this year. Pt has been slow to resume activities and has been receiving counseling for PTSD for what has happened over the last 2 years +trauma from her past.     PMHx:No past medical history on file.    [x] HTN [x] Other: Sjogren's syndrome: skin tightness - has improved in the last year, pneumonia              [x] Refer to full medical chart  In EPIC       Comorbidities:   [x] Obesity [x] L frontal CVA   [x] pneumonia [x] PTSD   [x] Stroke [x] Sjogren's syndrome   [x] lupus      Tests: [x] X-Ray: 11/8/22 lung   Examination is limited by the patient's body habitus and by portable   technique. This is especially noted in the left lower lung. That said, the cardial pericardial silhouette is unremarkable. There is   questionable airspace disease in the lower lungs, greater on the left. No   pneumothorax is seen. No free air. No acute bony abnormality. MRV of head 11/21:   IMPRESSION:     1. Asymmetry of the transverse and jugular sinuses with smaller caliber of the left transverse and jugular sinus compared to the right. Changes could be from prior history of dural venous sinus thrombosis and chronic change. 2.  Sagittal, right transverse and jugular sinuses are patent. Medications: [x] Refer to full medical record  Allergies:      [x] Refer to full medical record    Function:  Hand Dominance  [x] Right    Patient lives with: Lives with girlfriend   In what type of home [x]  One story on 2nd level     Number of stairs to enter 5-6   Bathroom has a   [x] Tub/shower combo   has detachable  sprayer and a grab bar   Washing machine is on Lower level from where she lives; if she has to carry for long it is difficult   Employer  Used to work in [employer].    Job Status   [x] Off due to condition     [x] Disability-applying for it     Work activities/duties Does all self care, housework, shopping etc       ADL/IADL Previous level of function Current level of function Who currently assists the patient with task   Bathing  [x] Independent  [] Assist [x] Independent  [] Assist    Dress/grooming [x] Independent  [] Assist [x] Independent  [] Assist    Transfer/mobility [x] Independent  [] Assist [x] Independent  [] Assist    Feeding [x] Independent  [] Assist [x] Independent  [] Assist    Toileting [x] Independent  [] Assist [x] Independent  [] Assist    Driving [x] Independent  [] Assist [x] Independent  [] Assist    Housekeeping [x] Independent  [] Assist [x] Independent  [] Assist With breaks and days where it is more difficult   Grocery shop/meal prep [x] Independent  [] Assist [x] Independent  [] Assist      Gait Prior level of function Current level of function    [x] Independent  [] Assist [x] Independent  [] Assist   Device: [x] Independent [x] Independent     Pain:  [x] Yes   Location: multiple body parts Pain Rating: (0-10 scale) 7/10 especially in cold weather   Pain altered Tx:  [x] No      Symptoms:   [x] Worsening in cold weather   Better:     [x] Other: epsom salt baths, movement  Worse:        [x] Other: cold  Sleep: [x] OK mostly   [x] Disturbed- at times has to get up and move around to [facility] the pain off\"    Objective:      STRENGTH  STRENGTH  ROM    Left Right  Left Right Cervical stiffness   C5 Shld Abd 4+ 4+ L1-2 Hip Flex 4 4- Flexion WFL      Hip extension 2+ 2+     Shld Flexion 4+ 4+ Hip Abd 4- 4- Extension WFL   Shld IR 5 5 L3-4 Knee Ext 5 5 Rotation L 60 R 60   Shld ER 4+ 4+ L4 Ankle DF   Sidebend L30 R 30   C6 Elb Flex 4+ 4 L5 EHL   Retraction       Ankle inv/ever 4+/3+ 4+/3+     C7 Elb Ext 5 5 S1 Plant. Flex 3- 2+ Lumbar    C8 EPL   Abdominals 2+  Flexion 90   T1 Fing Abd   Erector Spinae 2+  Extension 35         Rotation L  R         Sidebend L WFL R WFL         UE/LE           Shoulder IR T5 T8         Shoulder flexion/abd [facility] WFL                                      TESTS (+/-) LEFT RIGHT Not Tested   SLR [] sit [] supine - - []   Hamstring (SLR) 70 70 []   SKTC - - []   DKTC   [x]   Slump/Dural - - []   SI JT +  []   AYAN - - []   Joint Mobility L2/3/4 L2/3/4 []   Cerv. Comp   [x]   Cerv.  Distraction   [x]   SLS 20+ sec ea LE    OBSERVATION No Deficit Deficit Not Tested Comments   Posture       Forward Head [x] [] []    Rounded Shoulders [x] [] []    Kyphosis [x] [] []    Lordosis [] [x] [] inc   Lateral Shift [x] [] []    Scoliosis [x] [] []    Iliac Crest [x] [] []    PSIS [x] [] []    ASIS [x] [] []    Genu Valgus [] [x] []    Genu Varus [x] [] []    Genu Recurvatum [] [x] []    Pronation [] [x] []    Supination [x] [] []    Leg Length Discrp [] [] []    Slumped Sitting [x] [] []    Palpation [] [x] [] Mm LB and L2,3,4 and sacrum discomfort   Sensation [] [] [x]    Edema [] [] []    Neurological [] [] []        Functional Test: OSWESTRY: 28/50 Score: 56% functionally impaired     Comments:    Assessment: the patient is a motivated and pleasant 27year old female who, in the last 2 years has had increased pains following a CVA (affected R side), lupus diagnosis and has been dealing with a change in function and pain levels. The patient had weakness of the core and spine and R extremities more than the left. She can easily overexert herself and therefore will require slow progression with exercises to avoid aggravating her lupus. Also, manual therapy will be added to further enhance pain reduction for an improved quality of life. Patient would benefit from skilled physical therapy services in order to: reduce pain, improve posture and strength of core and extremities so that she performs her ADLs with greater confidence and less pain. Problem list, as detailed above:   [x] ? Back Pain: 7/10 lumbar, shoulders, wrists and knees     [x] ? ROM: mild tightness throughout    [x] ? Strength: Core, spine weakness, R more than L extremities especially the hips/LEs are weak  [x] ? Function: limited by pain, though she pushes through it  [x] Postural Deviations: increased lordosis lumbar      STG: (to be met in 6 treatments)  ? Pain: below 7/10 avg pain level  ? ROM: tolerate flexibility ex for home so that she can manage pain levels   ? Strength: tolerate ex for core, spine, hips, LEs for at least 45 minutes without aggravating symptoms so that she can feel more confident with completing ADLs  ?  Function: walk through grocery store without needing to rest  Patient to be independent with home exercise program as demonstrated by performance with correct form without cues. LTG: (to be met in 12 treatments)  Below 5/10 avg pain for improved quality of life  (I) with HEP and progress to community based exercise program so that she can continue with the gains made in PT  Have at least 4/5 strength in affected mm so that she has less pain and improved postural support for improved endurance with ADLs  Be able to lift laundry basket and carry it to her apartment with minimal pain    Patient goals:\"gain mobility for independence\"    Rehab Potential:  [x] Good  [] Fair  [] Poor   Suggested Professional Referral:  [x] No  [] Yes:  Barriers to Goal Achievement:  [] No  [x] Yes: lupus  Domestic Concerns:  [x] No  [] Yes:    Pt. Education:  [x] Plans/Goals, Risks/Benefits discussed  [] Home exercise program  Method of Education: [] Verbal  [] Demo  [] Written  Comprehension of Education:  [x] Verbalizes understanding. [] Demonstrates understanding. [] Needs Review. [] Demonstrates/verbalizes understanding of HEP/Ed previously given. Treatment Plan:  [x] Therapeutic Exercise   26054    [x] Therapeutic Activity  98159   [x] Manual Therapy  60235   [x] Instruction in HEP    [x] Cold/hotpack       Frequency:  2 x/week for 12 visits    Todays Treatment:  Modalities:   Precautions: bright light can be bothersome; pt with lupus. Needs to advance gradually so there is no flare-up  Exercises:  Exercise Reps/ Time Weight/ Level Comments                                 Other:    Specific Instructions for next treatment: start with nu-step, progress to treadmill. Core, spine, hip strengthening, UE, LE flexibility, spine flexibility; instruct in proper lifting mechanics.  Troy Speaks to do manual as needed (dural tube stretch-others as needed)      Evaluation Complexity:  History (Personal factors, comorbidities) [] 0 [] 1-2 [x] 3+   Exam (limitations, restrictions) [] 1-2 [] 3 [x] 4+   Clinical presentation (progression) [] Stable [x] Evolving  [] Unstable   Decision Making [] Low [x] Moderate [] High    [] Low Complexity [x] Moderate Complexity [] High Complexity       Treatment Charges: Mins Units   [x] Evaluation       []  Low       [x]  Moderate       []  High 55 1   []  Modalities     []  Ther Exercise     []  Manual Therapy     []  Ther Activities     []  Aquatics     []  Vasocompression     []  Other       TOTAL TREATMENT TIME: 55    Time in: 12:00 pm      Time out: 1:00 pm    Electronically signed by: Karen Vu PT        Physician Signature:________________________________Date:__________________  By signing above or cosigning this note, I have reviewed this plan of care and certify a need for medically necessary rehabilitation services.      *PLEASE SIGN ABOVE AND FAX BACK ALL PAGES*

## 2022-12-13 ENCOUNTER — HOSPITAL ENCOUNTER (OUTPATIENT)
Dept: PHYSICAL THERAPY | Facility: CLINIC | Age: 32
Setting detail: THERAPIES SERIES
Discharge: HOME | End: 2022-12-13
Payer: COMMERCIAL

## 2022-12-13 PROCEDURE — 97110 THERAPEUTIC EXERCISES: CPT

## 2022-12-13 NOTE — FLOWSHEET NOTE
[] [facility]  [address]  [phone]  F: [phone] [x] [address]  [facility] 36   [address]  P: [phone]  F: [phone] [] [place] &  Therapy  [address]  P: [phone]  F: [phone] [] [address]  P: [phone]  F: [phone] [] [address]  [facility]   [address]   [place]: [phone]  F: [phone]      Physical Therapy Daily Treatment Note    Date:  2022  Patient Name:  Jerry Austin    :  1990  MRN: [id number]   Physician: ANA Nieto                                      Insurance: [employer]  Medical Diagnosis: Sjogren's syndrome with lung involvement; chronic midline LBP without sciatica                        Rehab Codes: M54.59; M25.60; M79.1; R29.3; M62.81  Onset Date: 2020                        Next 's appt.: 22  Next Dr. Benavides Ship:   Visit# / total visits:    Cancels/No Shows:     Subjective:    Pain:  [x] Yes  [] No Location: LBP  Pain Rating: (0-10 scale) 7.5/10  Pain altered Tx:  [x] No  [] Yes  Action:  Comments: Pt arrives with pain present in LB. Objective:  Modalities:   Precautions: bright light can be bothersome; pt with lupus. Needs to advance gradually so there is no flare-up  Exercises:  Exercise Reps/ Time Weight/ Level Comments   Nu step  9'  L1               Supine          Hooklying TrA 10x5\"       PPT 10x3\"       LTR  10x3\"     HS stretch  3x15\" Strap     March + TrA 10x     Hooklying hip abd  10x Lime     Piriformis stretch  2x15\" ea way   FIG 4, to opp shoulder    Bridges 10x  Small range                Other:     Specific Instructions for next treatment: start with nu-step, progress to treadmill.  Core, spine, hip strengthening, UE, LE flexibility, spine flexibility; instruct in proper lifting mechanics. Emmit Severance to do manual as needed (dural tube stretch-others as needed)    Treatment Charges: Mins Units   []  Modalities     [x]  Ther Exercise 46 3   []  Manual Therapy     []  Ther Activities     []  Aquatics     []  Vasocompression     []  Other     Total Treatment time 46 3       Assessment: [x] Progressing toward goals. Initiated session on nu step to promote increased blood flow and endurance. Focus on stretches to increase mobility today. Incorporated exercises to progress B LE and core strength to tolerance. Tactile cueing for proper execution of PPT this date. Instructed in proper lifting mechanics to reduce toll on LB, pt ensures understanding. Educated on DOMS. Will monitor sx's and progress program to tolerance. [] No change. [] Other:  [x] Patient would continue to benefit from skilled physical therapy services in order to: reduce pain, improve posture and strength of core and extremities so that she performs her ADLs with greater confidence and less pain. STG/LTG  Problem list, as detailed above:   [x] ? Back Pain: 7/10 lumbar, shoulders, wrists and knees                             [x] ? ROM: mild tightness throughout               [x] ? Strength:  Core, spine weakness, R more than L extremities especially the hips/LEs are weak  [x] ? Function: limited by pain, though she pushes through it  [x] Postural Deviations: increased lordosis lumbar        STG: (to be met in 6 treatments)  ? Pain: below 7/10 avg pain level  ? ROM: tolerate flexibility ex for home so that she can manage pain levels   ? Strength: tolerate ex for core, spine, hips, LEs for at least 45 minutes without aggravating symptoms so that she can feel more confident with completing ADLs  ?  Function: walk through grocery store without needing to rest  Patient to be independent with home exercise program as demonstrated by performance with correct form without cues. LTG: (to be met in 12 treatments)  Below 5/10 avg pain for improved quality of life  (I) with HEP and progress to community based exercise program so that she can continue with the gains made in PT  Have at least 4/5 strength in affected mm so that she has less pain and improved postural support for improved endurance with ADLs  Be able to lift laundry basket and carry it to her apartment with minimal pain     Patient goals:\"gain mobility for independence\"    Pt. Education:  [x] Yes  [] No  [x] Reviewed Prior HEP/Ed  Method of Education: [x] Verbal  [] Demo  [x] Written  Access Code: [email or web address]  URL: [email or web address]  Date: 12/13/2022  Prepared by:    Exercises  Supine Lower Trunk Rotation - 1 x daily - 7 x weekly - 2 sets - 10 reps - 3 sec hold  Supine Transversus Abdominis Bracing - Hands on Stomach - 1 x daily - 7 x weekly - 2 sets - 10 reps - 5 sec hold  Supine Piriformis Stretch with Foot on Ground - 1 x daily - 7 x weekly - 3 sets - 15 sec hold  Supine Single Knee to Chest Stretch - 1 x daily - 7 x weekly - 5 sets - 5-10 sec hold  Supine Figure 4 Piriformis Stretch - 1 x daily - 7 x weekly - 3 sets - 15 sec hold  Supine Posterior Pelvic Tilt - 1 x daily - 7 x weekly - 2 sets - 10 reps - 3 sec hold  Supine Hamstring Stretch with Strap - 1 x daily - 7 x weekly - 3 sets - 15 sec hold  Supine March - 1 x daily - 7 x weekly - 2 sets - 10 reps  Supine Bridge - 1 x daily - 7 x weekly - 1 sets - 10 reps  Seated Hamstring Stretch - 1 x daily - 7 x weekly - 3 sets - 20 sec hold    Comprehension of Education:  [x] Verbalizes understanding. [] Demonstrates understanding. [] Needs review. [] Demonstrates/verbalizes HEP/Ed previously given. Plan: [x] Continue current frequency toward long and short term goals.     [] Specific Instructions for subsequent treatments:       Time In: 10:05 am             Time Out: 11:00 am     Electronically signed by:  Heidi Watts PTA

## 2022-12-15 ENCOUNTER — HOSPITAL ENCOUNTER (OUTPATIENT)
Dept: PHYSICAL THERAPY | Facility: CLINIC | Age: 32
Setting detail: THERAPIES SERIES
Discharge: HOME | End: 2022-12-15
Payer: COMMERCIAL

## 2022-12-15 PROCEDURE — 97110 THERAPEUTIC EXERCISES: CPT

## 2022-12-15 NOTE — FLOWSHEET NOTE
[] [facility]  [address]  [phone]  F: [phone] [x] [address]  [facility] 36   [address]  P: [phone]  F: [phone] [] [place] &  Therapy  [address] [facility]  P: [phone]  F: [phone] [] [address]  P: [phone]  F: [phone] [] [address]  [facility]   [address]   [place]: [phone]  F: [phone]      Physical Therapy Daily Treatment Note    Date:  12/15/2022  Patient Name:  Messi Andrews  \"QYAXDUE\" (Short a) :  1990  MRN: [id number]   Physician: Ana Mustafa, SWATI-TOMASA                                      Insurance: [employer]  Medical Diagnosis: Sjogren's syndrome with lung involvement; chronic midline LBP without sciatica                        Rehab Codes: M54.59; M25.60; M79.1; R29.3; M62.81  Onset Date: 2020                        Next 's appt.: 22  Next  [facility]:   Visit# / total visits: 3/12   Cancels/No Shows:     Subjective:    Pain:  [x] Yes  [] No Location: LBP  Pain Rating: (0-10 scale) 7.5/10  Pain altered Tx:  [x] No  [] Yes  Action:  Comments: Pt with some soreness post last exercise but is feeling better now. Did HS, piriformis and SKTC at home yesterday. Pt using new lifting mechanics taught at last session and finds it helpful. Objective:  Modalities:   Precautions: bright light can be bothersome; pt with lupus.  Needs to advance gradually so there is no flare-up  Exercises: bolded 12/15/22   Access Code: [email or web address]  Exercise Reps/ Time Weight/ Level Comments   Nu step  9'  L1               Supine          Hooklying TrA 10x5\"       Alt arm lift 5ea A Started 12/15/22   Alt leg lift 5 ea A Started 12/15/22               PPT 10x3\" LTR  10x3\"     HS stretch  3x15\" Strap  With active df/pf on 12/15/22   Hooklying hip abd  10x Lime     Bent knee drop out 5 ea A Started 12/15/22- inc ROM   Piriformis stretch  2x15\" ea way   FIG 4, to opp shoulder    Bridges 10x  Small range    Gluteal, ta contractions 5 5 sec With breathing. Added 12/15/22         Other:therapeutic activities: 12/13: instructed in lifting mechanics; 12/15: pt demonstrates proper form     Specific Instructions for next treatment: start with nu-step, progress to treadmill. Core, spine, hip strengthening, UE, LE flexibility, spine flexibility; instruct in proper lifting mechanics. Josim Dew to do manual as needed (dural tube stretch-others as needed)    Treatment Charges: Mins Units   []  Modalities     [x]  Ther Exercise 39 3   []  Manual Therapy     []  Ther Activities     []  Aquatics     []  Vasocompression     []  Other     Total Treatment time 39 3       Assessment: [x] Progressing toward goals. The patient follows directions well. She remembers instructions from the last visit. She is still quite weak with certain exercises such as bridges, however has an excellent transverse abdominus contraction. The patient is motivated to advance. Was able to update home program as described below. [] No change. [] Other:  [x] Patient would continue to benefit from skilled physical therapy services in order to: reduce pain, improve posture and strength of core and extremities so that she performs her ADLs with greater confidence and less pain. Problem list, as detailed above:   [x] ? Back Pain: 7/10 lumbar, shoulders, wrists and knees                             [x] ? ROM: mild tightness throughout               [x] ? Strength:  Core, spine weakness, R more than L extremities especially the hips/LEs are weak  [x] ? Function: limited by pain, though she pushes through it  [x] Postural Deviations: increased lordosis lumbar        STG: (to be met in 6 treatments)  ?  Pain: below 7/10 avg pain level  ? ROM: tolerate flexibility ex for home so that she can manage pain levels   ? Strength: tolerate ex for core, spine, hips, LEs for at least 45 minutes without aggravating symptoms so that she can feel more confident with completing ADLs  ? Function: walk through grocery store without needing to rest  Patient to be independent with home exercise program as demonstrated by performance with correct form without cues. LTG: (to be met in 12 treatments)  Below 5/10 avg pain for improved quality of life  (I) with HEP and progress to community based exercise program so that she can continue with the gains made in PT  Have at least 4/5 strength in affected mm so that she has less pain and improved postural support for improved endurance with ADLs  Be able to lift laundry basket and carry it to her apartment with minimal pain     Patient goals:\"gain mobility for independence\"    Pt. Education:  [x] Yes  [] No  [x] Reviewed Prior HEP/Ed  Method of Education: [x] Verbal  [x] Demo  [x] Written: 12/15: lime green loop band, written + texted ex to her phone  Access Code: [email or web address]  URL: [email or web address]  Date: 12/15/2022  Prepared by: Hannah Andrade    Exercises  Supine Gluteal Sets - 1 x daily - 3 x weekly - 1 sets - 5-10 reps - 5 hold  Hooklying Clamshell with Resistance - 1 x daily - 3 x weekly - 1 sets - 10 reps      Access Code: [email or web address]  URL: [email or web address]  Date: 12/13/2022  Prepared by:    Exercises  Supine Lower Trunk Rotation - 1 x daily - 7 x weekly - 2 sets - 10 reps - 3 sec hold  Supine Transversus Abdominis Bracing - Hands on Stomach - 1 x daily - 7 x weekly - 2 sets - 10 reps - 5 sec hold  Supine Piriformis Stretch with Foot on Ground - 1 x daily - 7 x weekly - 3 sets - 15 sec hold  Supine Single Knee to Chest Stretch - 1 x daily - 7 x weekly - 5 sets - 5-10 sec hold  Supine Figure 4 Piriformis Stretch - 1 x daily - 7 x weekly - 3 sets - 15 sec hold  Supine Posterior Pelvic Tilt - 1 x daily - 7 x weekly - 2 sets - 10 reps - 3 sec hold  Supine Hamstring Stretch with Strap - 1 x daily - 7 x weekly - 3 sets - 15 sec hold  Supine March - 1 x daily - 7 x weekly - 2 sets - 10 reps  Supine Bridge - 1 x daily - 7 x weekly - 1 sets - 10 reps  Seated Hamstring Stretch - 1 x daily - 7 x weekly - 3 sets - 20 sec hold    Comprehension of Education:  [x] Verbalizes understanding. [x] Demonstrates understanding. [x] Needs review. [x] Demonstrates/verbalizes HEP/Ed previously given. Plan: [x] Continue current frequency toward long and short term goals.     [] Specific Instructions for subsequent treatments:       Time In: 10:32 am             Time Out: 11:20 am     Electronically signed by:  Becky Yip PT

## 2022-12-20 ENCOUNTER — HOSPITAL ENCOUNTER (OUTPATIENT)
Dept: PHYSICAL THERAPY | Facility: CLINIC | Age: 32
Setting detail: THERAPIES SERIES
Discharge: HOME | End: 2022-12-20
Payer: COMMERCIAL

## 2022-12-20 PROCEDURE — 97110 THERAPEUTIC EXERCISES: CPT

## 2022-12-20 NOTE — FLOWSHEET NOTE
[] [facility] [address]  [address]  [phone]  F: [phone] [x] [address]  [facility]   [address]  P: [phone]  F: [phone] [] [place] &  Therapy  [address] [facility]  P: [phone]  F: [phone] [] [address]  P: [phone]  F: [phone] [] [address]  [facility]   [address]   [place]: [phone]  F: [phone]      Physical Therapy Daily Treatment Note    Date:  2022  Patient Name:  Nanci Juarez  \"LHUZQZM\" (Short a) :  1990  MRN: [id number]   Physician: ANA Yancey                                      Insurance: [employer]  Medical Diagnosis: Sjogren's syndrome with lung involvement; chronic midline LBP without sciatica                        Rehab Codes: M54.59; M25.60; M79.1; R29.3; M62.81  Onset Date: 2020                        Next 's appt.: 22  Next  [address] [place] [address]:   Visit# / total visits:    Cancels/No Shows:     Subjective:    Pain:  [x] Yes  [] No Location: LBP  Pain Rating: (0-10 scale) 6/10  Pain altered Tx:  [x] No  [] Yes  Action:  Comments: Pt arrives noting she did some exercises over the weekend making sure not to overdo it. Presents with reduced pain this date. Objective:  Modalities:   Precautions: bright light can be bothersome; pt with lupus.  Needs to advance gradually so there is no flare-up  Exercises: bolded 22   Access Code: [email or web address]  Exercise Reps/ Time Weight/ Level Comments   Nu step  10'  L2  progressed time/resistance ; aiming for 50-60 spm             Supine          Hooklying TrA 10x5\"       Alt arm lift 5ea A Started 12/15/22   Alt leg lift 5 ea A Started 12/15/22   SLR+TA 5xea     marches + TA 10x extremities so that she performs her ADLs with greater confidence and less pain. Problem list, as detailed above:   [x] ? Back Pain: 7/10 lumbar, shoulders, wrists and knees                             [x] ? ROM: mild tightness throughout               [x] ? Strength:  Core, spine weakness, R more than L extremities especially the hips/LEs are weak  [x] ? Function: limited by pain, though she pushes through it  [x] Postural Deviations: increased lordosis lumbar        STG: (to be met in 6 treatments)  ? Pain: below 7/10 avg pain level  ? ROM: tolerate flexibility ex for home so that she can manage pain levels   ? Strength: tolerate ex for core, spine, hips, LEs for at least 45 minutes without aggravating symptoms so that she can feel more confident with completing ADLs  ? Function: walk through grocery store without needing to rest  Patient to be independent with home exercise program as demonstrated by performance with correct form without cues. LTG: (to be met in 12 treatments)  Below 5/10 avg pain for improved quality of life  (I) with HEP and progress to community based exercise program so that she can continue with the gains made in PT  Have at least 4/5 strength in affected mm so that she has less pain and improved postural support for improved endurance with ADLs  Be able to lift laundry basket and carry it to her apartment with minimal pain     Patient goals:\"gain mobility for independence\"    Pt. Education:  [x] Yes  [] No  [x] Reviewed Prior HEP/Ed  Method of Education: [x] Verbal form as needed [x] Demo new exercises  [] Written:   12/15: lime green loop band, written + texted ex to her phone  Access Code: [email or web address]  URL: [email or web address]  Date: 12/15/2022  Prepared by: Mariela King    Exercises  Supine Gluteal Sets - 1 x daily - 3 x weekly - 1 sets - 5-10 reps - 5 hold  Hooklying Clamshell with Resistance - 1 x daily - 3 x weekly - 1 sets - 10 reps      Access Code: [email or web address]  URL: [email or web address]. com/  Date: 12/13/2022  Prepared by:    Exercises  Supine Lower Trunk Rotation - 1 x daily - 7 x weekly - 2 sets - 10 reps - 3 sec hold  Supine Transversus Abdominis Bracing - Hands on Stomach - 1 x daily - 7 x weekly - 2 sets - 10 reps - 5 sec hold  Supine Piriformis Stretch with Foot on Ground - 1 x daily - 7 x weekly - 3 sets - 15 sec hold  Supine Single Knee to Chest Stretch - 1 x daily - 7 x weekly - 5 sets - 5-10 sec hold  Supine Figure 4 Piriformis Stretch - 1 x daily - 7 x weekly - 3 sets - 15 sec hold  Supine Posterior Pelvic Tilt - 1 x daily - 7 x weekly - 2 sets - 10 reps - 3 sec hold  Supine Hamstring Stretch with Strap - 1 x daily - 7 x weekly - 3 sets - 15 sec hold  Supine March - 1 x daily - 7 x weekly - 2 sets - 10 reps  Supine Bridge - 1 x daily - 7 x weekly - 1 sets - 10 reps  Seated Hamstring Stretch - 1 x daily - 7 x weekly - 3 sets - 20 sec hold    Comprehension of Education:  [x] Verbalizes understanding. [x] Demonstrates understanding. [x] Needs review. [x] Demonstrates/verbalizes HEP/Ed previously given. Plan: [x] Continue current frequency toward long and short term goals.     [] Specific Instructions for subsequent treatments:       Time In: 11:02 am            Time Out: 11:52 am     Electronically signed by:  Lefty Harvey PTA

## 2022-12-22 ENCOUNTER — HOSPITAL ENCOUNTER (OUTPATIENT)
Dept: PHYSICAL THERAPY | Facility: CLINIC | Age: 32
Setting detail: THERAPIES SERIES
Discharge: HOME | End: 2022-12-22
Payer: COMMERCIAL

## 2022-12-22 NOTE — FLOWSHEET NOTE
[] [facility]) - [facility] &  Therapy  [address]    [phone]  F: [phone]   [x] [address]  [facility]   [address]  P: [phone]  F: [phone]  [] [facility] &  Therapy  [address] [facility]  P: [phone]  F: [phone]   [] [address]  P: [phone]  F: [phone]  [] [facility]  [address] [facility] [address]  [place]: [phone]   F: [phone] [] [facility] &  Therapy  [facility] [address]   [place]: [phone]  F: [phone]         Physical Therapy Cancel/No Show note     Date: 22   Patient: Shreyas Santos                        :  1990                               MRN: [id number]     Cancels/No Shows to date:      For today's appointment patient:    [x]  Cancelled    [] Rescheduled appointment    [] No-show     Reason given by patient:    []  Patient ill    []  Conflicting appointment    [] No transportation                 [] Conflict with work    [x] No reason given    [] Weather related    [] COVID-19    [x] Other:                 Comments: Pt CX therapy this date d/t the cold weather. Reports she does not have the motivation today.         [x] Next appointment was confirmed     Electronically signed by: Joy Henry PTA

## 2022-12-27 ENCOUNTER — HOSPITAL ENCOUNTER (OUTPATIENT)
Dept: PHYSICAL THERAPY | Facility: CLINIC | Age: 32
Setting detail: THERAPIES SERIES
Discharge: HOME | End: 2022-12-27
Payer: COMMERCIAL

## 2022-12-27 PROCEDURE — 97110 THERAPEUTIC EXERCISES: CPT

## 2022-12-27 NOTE — FLOWSHEET NOTE
[] [facility] [address] [facility]  [address]  [phone]  F: [phone] [x] [address]  [place] [address]   [address]  P: [phone]  F: [phone] [] [address]  [address]  P: [phone]  F: [phone] [] [address]  P: [phone]  F: [phone] [] [address]  [facility]   [address]   [place]: [phone]  F: [phone]      Physical Therapy Daily Treatment Note    Date:  2022  Patient Name:  Allyson Downs  \"ZQICRNH\" (Short a) :  1990  MRN: [id number]   Physician: ANA Palm                                      Insurance: [employer]  Medical Diagnosis: Sjogren's syndrome with lung involvement; chronic midline LBP without sciatica                        Rehab Codes: M54.59; M25.60; M79.1; R29.3; M62.81  Onset Date: 2020                        Next 's appt.: 22  Next  [address] [place] [address]:   Visit# / total visits:    Cancels/No Shows: 1/0    Subjective:    Pain:  [] Yes  [x] No Location: LBP  Pain Rating: (0-10 scale)  0/10  Pain altered Tx:  [x] No  [] Yes  Action:  Comments: Pt arrives without pain this date. Did do some walking at the store the other day and is pushing herself to walk further. Reports she has been completing HEP approximately every other day. Objective:  Modalities:   Precautions: bright light can be bothersome; pt with lupus.  Needs to advance gradually so there is no flare-up  Exercises: bolded 22   Access Code: [email or web address]  Exercise Reps/ Time Weight/ Level Comments   Nu step  10'  L2 Progressed time/resistance ; aiming for 50-60 spm             Supine          Hooklying TrA 15x5\"       Alt arm lift 5ea A Started 12/15/22   Alt leg lift 5 ea A Started 12/15/22   SLR+TA 5xea     marches + TA 10x           PPT 10x3\"             LTR  10x3\"     HS stretch  3x20\" Strap  With active df/pf on 12/15/22   Hooklying hip abd  15x Lime     Bent knee drop out+TA 10 ea A Started 12/15/22- inc ROM   Piriformis stretch  2x15\" ea way   FIG 4, to opp shoulder    Bridges 15x Lime  Small range    Gluteal, ta contractions 5x 5 sec With breathing. Added 12/15/22   Hip add 10x5\" Charla Faviola 12/27         SL clamshells  10xea  New 12/20         Standing   Hand rails    3 way hip  10xea  Progressed to 3 way 12/27   Heel raise 10x     March  10x     Other:therapeutic activities: 12/13: instructed in lifting mechanics; 12/15: pt demonstrates proper form     Specific Instructions for next treatment: start with nu-step, progress to treadmill. Core, spine, hip strengthening, UE, LE flexibility, spine flexibility; instruct in proper lifting mechanics. Emmit Severance to do manual as needed (dural tube stretch-others as needed)    Treatment Charges: Mins Units   []  Modalities     [x]  Ther Exercise 48 3   []  Manual Therapy     []  Ther Activities     []  Aquatics     []  Vasocompression     []  Other     Total Treatment time 48 3       Assessment: [x] Progressing toward goals. Initiated session on nu step to promote endurance and prepare for therapeutic strengthening program. Progressed reps for bridges and resisted hip abduction in hooklying. All exercises completed slow and controlled for increased ms recruitment. Progressed to 3 way hip this date completed in // bars with cues to reduce UT activation and trunk compensation with good carryover. Pt with ms soreness post tx. Tolerated progressions well. [] No change. [] Other:  [x] Patient would continue to benefit from skilled physical therapy services in order to: reduce pain, improve posture and strength of core and extremities so that she performs her ADLs with greater confidence and less pain.       Problem list, as detailed above: [x] ? Back Pain: 7/10 lumbar, shoulders, wrists and knees                             [x] ? ROM: mild tightness throughout               [x] ? Strength:  Core, spine weakness, R more than L extremities especially the hips/LEs are weak  [x] ? Function: limited by pain, though she pushes through it  [x] Postural Deviations: increased lordosis lumbar        STG: (to be met in 6 treatments)  ? Pain: below 7/10 avg pain level  ? ROM: tolerate flexibility ex for home so that she can manage pain levels   ? Strength: tolerate ex for core, spine, hips, LEs for at least 45 minutes without aggravating symptoms so that she can feel more confident with completing ADLs  ? Function: walk through grocery store without needing to rest  Patient to be independent with home exercise program as demonstrated by performance with correct form without cues. LTG: (to be met in 12 treatments)  Below 5/10 avg pain for improved quality of life  (I) with HEP and progress to community based exercise program so that she can continue with the gains made in PT  Have at least 4/5 strength in affected mm so that she has less pain and improved postural support for improved endurance with ADLs  Be able to lift laundry basket and carry it to her apartment with minimal pain     Patient goals:\"gain mobility for independence\"    Pt. Education:  [x] Yes  [] No  [x] Reviewed Prior HEP/Ed  Method of Education: [x] Verbal reduce UT and trunk compensation  [x] Demo 3 way hip  [] Written:   12/15: lime green loop band, written + texted ex to her phone  Access Code: [email or web address]  URL: [email or web address]  Date: 12/15/2022  Prepared by: Yoav Peterson    Exercises  Supine Gluteal Sets - 1 x daily - 3 x weekly - 1 sets - 5-10 reps - 5 hold  Hooklying Clamshell with Resistance - 1 x daily - 3 x weekly - 1 sets - 10 reps      Access Code: [email or web address]  URL: [email or web address]  Date: 12/13/2022  Prepared by: Meg     Exercises  Supine Lower Trunk Rotation - 1 x daily - 7 x weekly - 2 sets - 10 reps - 3 sec hold  Supine Transversus Abdominis Bracing - Hands on Stomach - 1 x daily - 7 x weekly - 2 sets - 10 reps - 5 sec hold  Supine Piriformis Stretch with Foot on Ground - 1 x daily - 7 x weekly - 3 sets - 15 sec hold  Supine Single Knee to Chest Stretch - 1 x daily - 7 x weekly - 5 sets - 5-10 sec hold  Supine Figure 4 Piriformis Stretch - 1 x daily - 7 x weekly - 3 sets - 15 sec hold  Supine Posterior Pelvic Tilt - 1 x daily - 7 x weekly - 2 sets - 10 reps - 3 sec hold  Supine Hamstring Stretch with Strap - 1 x daily - 7 x weekly - 3 sets - 15 sec hold  Supine March - 1 x daily - 7 x weekly - 2 sets - 10 reps  Supine Bridge - 1 x daily - 7 x weekly - 1 sets - 10 reps  Seated Hamstring Stretch - 1 x daily - 7 x weekly - 3 sets - 20 sec hold    Comprehension of Education:  [x] Verbalizes understanding. [x] Demonstrates understanding. [] Needs review. [x] Demonstrates/verbalizes HEP/Ed previously given. Plan: [x] Continue current frequency toward long and short term goals.     [] Specific Instructions for subsequent treatments:       Time In: 10:02 am          Time Out: 11:00 am     Electronically signed by:  Jose Post PTA

## 2022-12-29 ENCOUNTER — HOSPITAL ENCOUNTER (OUTPATIENT)
Dept: PHYSICAL THERAPY | Facility: CLINIC | Age: 32
Setting detail: THERAPIES SERIES
Discharge: HOME | End: 2022-12-29
Payer: COMMERCIAL

## 2022-12-29 ENCOUNTER — OFFICE VISIT (OUTPATIENT)
Dept: FAMILY MEDICINE CLINIC | Age: 32
End: 2022-12-29
Payer: COMMERCIAL

## 2022-12-29 VITALS
BODY MASS INDEX: 45.24 KG/M2 | HEART RATE: 92 BPM | HEIGHT: 64 IN | SYSTOLIC BLOOD PRESSURE: 133 MMHG | WEIGHT: 265 LBS | DIASTOLIC BLOOD PRESSURE: 89 MMHG

## 2022-12-29 DIAGNOSIS — M54.50 CHRONIC MIDLINE LOW BACK PAIN WITHOUT SCIATICA: ICD-10-CM

## 2022-12-29 DIAGNOSIS — F43.10 PTSD (POST-TRAUMATIC STRESS DISORDER): ICD-10-CM

## 2022-12-29 DIAGNOSIS — G89.29 CHRONIC MIDLINE LOW BACK PAIN WITHOUT SCIATICA: ICD-10-CM

## 2022-12-29 DIAGNOSIS — M79.7 FIBROMYALGIA: Primary | ICD-10-CM

## 2022-12-29 DIAGNOSIS — E55.9 VITAMIN D DEFICIENCY: ICD-10-CM

## 2022-12-29 PROCEDURE — G8417 CALC BMI ABV UP PARAM F/U: HCPCS | Performed by: NURSE PRACTITIONER

## 2022-12-29 PROCEDURE — G8427 DOCREV CUR MEDS BY ELIG CLIN: HCPCS | Performed by: NURSE PRACTITIONER

## 2022-12-29 PROCEDURE — 1036F TOBACCO NON-USER: CPT | Performed by: NURSE PRACTITIONER

## 2022-12-29 PROCEDURE — 97110 THERAPEUTIC EXERCISES: CPT

## 2022-12-29 PROCEDURE — G8484 FLU IMMUNIZE NO ADMIN: HCPCS | Performed by: NURSE PRACTITIONER

## 2022-12-29 PROCEDURE — 99213 OFFICE O/P EST LOW 20 MIN: CPT | Performed by: NURSE PRACTITIONER

## 2022-12-29 ASSESSMENT — ENCOUNTER SYMPTOMS
COLOR CHANGE: 0
CHEST TIGHTNESS: 0
SHORTNESS OF BREATH: 0
SINUS PAIN: 0
SINUS PRESSURE: 0
ABDOMINAL PAIN: 0
DIARRHEA: 0
CONSTIPATION: 0

## 2022-12-29 ASSESSMENT — PATIENT HEALTH QUESTIONNAIRE - PHQ9
1. LITTLE INTEREST OR PLEASURE IN DOING THINGS: 1
SUM OF ALL RESPONSES TO PHQ QUESTIONS 1-9: 1
SUM OF ALL RESPONSES TO PHQ9 QUESTIONS 1 & 2: 1
SUM OF ALL RESPONSES TO PHQ QUESTIONS 1-9: 1
SUM OF ALL RESPONSES TO PHQ QUESTIONS 1-9: 1
2. FEELING DOWN, DEPRESSED OR HOPELESS: 0
SUM OF ALL RESPONSES TO PHQ QUESTIONS 1-9: 1

## 2022-12-29 NOTE — PROGRESS NOTES
Amadeo Keys is a 56 [facility] y.o. female who presents in office today with Self follow up on chronic conditions including:   Patient Active Problem List   Diagnosis    Anemia of chronic disease    Sjogren's syndrome with lung involvement ([facility]    Seizures ([facility]    Lesion of left frontal lobe of brain    PTSD (post-traumatic stress disorder)    Dural venous sinus thrombosis    SLE (systemic lupus erythematosus related syndrome) (HCC)    Vitamin D deficiency    Chronic midline low back pain without sciatica    Fibromyalgia       Chief Complaint   Patient presents with    Medication Refill     Vitamin D was rx'd as 1 weekly but only dispensed 1 capsule    Follow-up     Rheumatologist hasn't called pt to schedule appt        History of Present Illness:     Medication Refill  Associated symptoms include fatigue. Pertinent negatives include no abdominal pain, arthralgias, chest pain, congestion, headaches, myalgias, numbness or rash. Here for follow up. She has scheduled with specialists whose offices have called her, but was unaware she needed to call for appointments at other places. Needing new order for Vitamin D. Only received one tablet. Has order for new vitamin D level. Has been working with physical therapy at [facility]. She is needing form filled out for JFS. She is getting fatigued by the end of treatment. Reports that physical therapist will be sending functional capacity information. She is considering medical weight loss through [facility]. Wants to work on getting to the gym and through [employer]. Her partner is good support for her and has been encouraging with going to exercise. Awaiting new therapy referral at [facility].        Specialists - needing new referrals  Rheumatology - Dameon Byrd - 3/30/21 - never received call back   Ophthalmology  Dermatology - [place] - Scheduled Dr Laurel Londono 2/23/23  Nephrology Nisreen Blackwood - 4/27/21 - awaiting return call  Neurology - Tania Cardona - 3/8/22 - awaiting call  Cardiology - [address] Sveat Dailey - 21  Pulmonology - [facility] Dr Valrie Cabot - 3/18/21 - awaiting return call locally        Care gaps: COVID-19 vaccine:   no - declines  Colon CA screening:   n/a  Vaccines:   declines flu  Hepatitis C/HIV screens:   ,   Breast CA screening:   n/a  OB/GYN, cervical CA screening:    due - appt scheduled 23 Alyssa Goodman  Depression Screenin    Health Maintenance Due   Topic Date Due    Varicella vaccine (1 of 2 - 2-dose childhood series) Never done    DTaP/Tdap/Td vaccine (1 - Tdap) Never done    Cervical cancer screen  Never done        Patient Care Team:  SWATI Yun CNP as PCP - General (Nurse Practitioner)  SWATI Yun CNP as PCP - [address] [place], [place] [address]radha Lujan Provider    Reviewed     [x] Past Medical, Family, and Social History was reviewed per writer and does contribute to the patient presenting condition. [x] Laboratory Results, Vital signs, Imaging, Active Problems, Immunizations, Current/Recently Discontinued Medications, Health Maintenance Activities Due, Referral Notes (if available) were reviewed per writer     [x] Reviewed Depression screening if taken or valid today or any other valid screening tool (others seen below) Interpretation of Total Score DepressionSeverity: 1-4 = Minimal depression, 5-9 = Mild depression, 10-14 = Moderate depression, 15-19 = Moderately severe depression, 20-27 =Severe depression    PHQ Scores 2022   PHQ2 Score 1 0   PHQ9 Score 1 0     Interpretation of Total Score Depression Severity: 1-4 = Minimal depression, 5-9 = Mild depression, 10-14 = Moderate depression, 15-19 = Moderately severe depression, 20-27 = Severe depression     Review of Systems (Subjective)     Review of Systems   Constitutional:  Positive for fatigue. Negative for activity change, appetite change and unexpected weight change.    HENT:  Negative for congestion, sinus pressure and sinus pain. Respiratory:  Negative for chest tightness and shortness of breath. Cardiovascular:  Negative for chest pain, palpitations and leg swelling. Gastrointestinal:  Negative for abdominal pain, constipation and diarrhea. Genitourinary:  Negative for difficulty urinating and dysuria. Musculoskeletal:  Negative for arthralgias and myalgias. Skin:  Negative for color change and rash. Neurological:  Negative for dizziness, numbness and headaches. Psychiatric/Behavioral:  Negative for dysphoric mood and sleep disturbance. The patient is not nervous/anxious. See HPI     Physical Assessment (Objective)     /89 (Site: Right Upper Arm)   Pulse 92   Ht 5' 4\" (1.626 m)   Wt 265 lb (120.2 kg)   BMI 45.49 kg/m²      Physical Exam  Vitals reviewed. Constitutional:       General: She is not in acute distress. Appearance: She is obese. HENT:      Head: Normocephalic and atraumatic. Right Ear: External ear normal.      Left Ear: External ear normal.      Nose: Nose normal.      Mouth/Throat:      Mouth: Mucous membranes are moist.      Pharynx: Oropharynx is clear. Eyes:      Extraocular Movements: Extraocular movements intact. Conjunctiva/sclera: Conjunctivae normal.      Pupils: Pupils are equal, round, and reactive to light. Cardiovascular:      Rate and Rhythm: Normal rate and regular rhythm. Pulses: Normal pulses. Heart sounds: Normal heart sounds. No murmur heard. Pulmonary:      Effort: Pulmonary effort is normal. No respiratory distress. Breath sounds: Normal breath sounds. Abdominal:      General: Bowel sounds are normal.      Palpations: Abdomen is soft. Musculoskeletal:         General: No swelling. Normal range of motion. Cervical back: Normal range of motion and neck supple. Skin:     General: Skin is warm and dry. Capillary Refill: Capillary refill takes less than 2 seconds.    Neurological:      Mental Status: She is alert and oriented to person, place, and time. Motor: No weakness. Gait: Gait normal.   Psychiatric:         Mood and Affect: Mood normal.         Behavior: Behavior normal.         Thought Content: Thought content normal.         Judgment: Judgment normal.       Diagnoses / Plan:   1. Fibromyalgia  2. PTSD (post-traumatic stress disorder)  3. Chronic midline low back pain without sciatica  4. Vitamin D deficiency  -     vitamin D (CHOLECALCIFEROL) 69947 UNIT CAPS; TAKE 1 CAPSULE BY MOUTH ONE TIME PER WEEK, Disp-12 capsule, R-2Normal     Discussed calling to schedule appointments with other specialists. Continue working with physical therapy and with [facility] therapist.  Will complete forms for JFS. Understanding and agreement was voiced with all above plans. All questions answered to satisfaction. Encouraged healthy diet and exercise. Call office with any questions or new or worsening symptoms or concerns. Return in about 3 months (around 3/29/2023), or if symptoms worsen or fail to improve, for chronic conditions. Electronically signed by SWATI Polk CNP on 12/29/2022 at 3:43 PM    Note is dictated utilizing voice recognition software. Unfortunately this leads to occasional typographical errors. Please contact our office if you have any questions.

## 2022-12-29 NOTE — FLOWSHEET NOTE
[] [facility].  [address]  [phone]  F: [phone] [x] [address] [facility] 36   [address]  P: [phone]  F: [phone] [] [facility] &  Therapy  [address]  P: [phone]  F: [phone] [] [address]  P: [phone]  F: [phone] [] [address]  [facility]   [address]   [place]: [phone]  F: [phone]      Physical Therapy Daily Treatment Note    Date:  2022  Patient Name:  Messi Andrews  \"QOXMVBW\" (Short a) :  1990  MRN: [id number]   Physician: Ana Mustafa, APRN-TOMASA                                      Insurance: [employer]  Medical Diagnosis: Sjogren's syndrome with lung involvement; chronic midline LBP without sciatica                        Rehab Codes: M54.59; M25.60; M79.1; R29.3; M62.81  Onset Date: 2020                        Next 's appt.: 22  Next  [address] [place] [address]:   Visit# / total visits:    Cancels/No Shows: 1/0    Subjective:    Pain:  [] Yes  [x] No Location: LBP  Pain Rating: (0-10 scale)  0/10  Pain altered Tx:  [x] No  [] Yes  Action:  Comments: took a bath due to pain yesterday but feeling well today. A little mm soreness still present. Pt is fatigued after therapy that day. Back is improved. Is using proper body mechanics for lifting. The patient is meeting with the [facility] in the new year to look at the facility and to see if she qualifies for a scholarship for membership. Objective:  Modalities:   Precautions: bright light can be bothersome; pt with lupus.  Needs to advance gradually so there is no flare-up  Exercises: bolded 22   Access Code: [email or web address]  Exercise Reps/ Time Weight/ Level Comments   Nu step  10'  L2 Progressed time/resistance 12/20; aiming for 50-60 spm    treadmill 7' 1.0 mph Started 12/29/22; therapist stood by today to give instruction, first time the patient walked on a treadmill         sitting      Stool scoot fwd/back 25 ft ea 2 Started 12/29/22                     Supine          Hooklying TrA 15x5\"       Alt arm lift 5ea A Started 12/15/22   Alt leg lift 5 ea A Started 12/15/22   SLR+TA 10xea  Progressed 12/29/22   marches + TA 10x     Alt arm/opp leg + TA 10A A Started 12/29/22   PPT 10x3\"             LTR  10x3\"     HS stretch  3x20\" Strap  With active df/pf on 12/15/22   Hooklying hip abd  15x Lime     Bent knee drop out+TA 10 ea A Started 12/15/22- inc ROM   Piriformis stretch  2x15\" ea way   FIG 4, to opp shoulder    Bridges 15x Lime  Small range    Gluteal, ta contractions 5x 5 sec With breathing. Added 12/15/22   Hip add 10x5\" Ball  New 12/27         SL clamshells  10xea  New 12/20         Standing   Hand rails    3 way hip  10xea  Progressed to 3 way 12/27   Heel raise/toe 15x  Progressed 12/29/22 March  10x  With ta contraction   Step-ups 10ea 4\" Started 12/29/22   Calf stretch on  wedge 1 1 min Started 12/29/22   Calf stretch on step 1 ea 30 sec Started 12/29/22   Side walk in slight knee flexion 15 ft ea way  With ta contraction; started 12/29/22   Standing plank on door 1 1 min Started 12/29/22   Pal-off 10 Double lime Started 21/29/22   Other:therapeutic activities: 12/13: instructed in lifting mechanics; 12/15: pt demonstrates proper form     Specific Instructions for next treatment: start with nu-step, progress to treadmill. Core, spine, hip strengthening, UE, LE flexibility, spine flexibility; instruct in proper lifting mechanics. Herslilal Zarephath to do manual as needed (dural tube stretch-others as needed)  ADD additional machines that she may be able to do at the [facility] should she get a membership there.      Treatment Charges: Mins Units   []  Modalities     [x]  Ther Exercise 50 3   []  Manual Therapy []  Ther Activities     []  Aquatics     []  Vasocompression     []  Other     Total Treatment time 50 3       Assessment: [x] Progressing toward goals. Started session with nu-step for warm-up. The patient was then progressed with standing core and strength exercises to improve not only strength but tolerance to standing activities. Started treadmill as part of this program. The patient did hold on to the sides for balance as this was her first time using a treadmill. Walking speed was kept at her comfort level of 1.0 mph. At the end of the session, supine exercises were performed. Throughout the session today, the patient was instructed in form and her home program was updated. The patient has a good understanding of instructions given. [] No change. [] Other:  [x] Patient would continue to benefit from skilled physical therapy services in order to: reduce pain, improve posture and strength of core and extremities so that she performs her ADLs with greater confidence and less pain. Problem list, as detailed above:   [x] ? Back Pain: 7/10 lumbar, shoulders, wrists and knees                             [x] ? ROM: mild tightness throughout               [x] ? Strength:  Core, spine weakness, R more than L extremities especially the hips/LEs are weak  [x] ? Function: limited by pain, though she pushes through it  [x] Postural Deviations: increased lordosis lumbar        STG: (to be met in 6 treatments)  ? Pain: below 7/10 avg pain level: MET 12/29/22  ? ROM: tolerate flexibility ex for home so that she can manage pain levels MET 12/29/22  ? Strength: tolerate ex for core, spine, hips, LEs for at least 45 minutes without aggravating symptoms so that she can feel more confident with completing ADLs: MET 12/29/22  ?  Function: walk through grocery store without needing to rest: DOING BETTER, BUT STILL RESTS 12/29/22  Patient to be independent with home exercise program as demonstrated by performance with correct form without cues. MET 12/29/22     LTG: (to be met in 12 treatments)  Below 5/10 avg pain for improved quality of life  (I) with HEP and progress to community based exercise program so that she can continue with the gains made in PT  Have at least 4/5 strength in affected mm so that she has less pain and improved postural support for improved endurance with ADLs  Be able to lift laundry basket and carry it to her apartment with minimal pain     Patient goals:\"gain mobility for independence\"    Pt. Education:  [x] Yes  [] No  [x] Reviewed Prior HEP/Ed  Method of Education: [x] Verbal reduce UT and trunk compensation  [x] Demo   [x] Written:     Access Code: [email or web address]  URL: [email or web address]  Date: 12/29/2022  Prepared by: Erica Connelly    Exercises  Standing Hamstring Stretch with Step - 1 x daily - 7 x weekly - 1 sets - 2 reps - 30 hold  Gastroc Stretch on Step - 1 x daily - 7 x weekly - 1 sets - 2 reps - 30 hold  Sidestepping - 1 x daily - 3 x weekly - 1 sets  Forearm Plank on Wall - 1 x daily - 3 x weekly - 1 sets - 1 reps - 60 sec hold  Heel Toe Raises with Counter Support - 1 x daily - 3 x weekly - 1-2 sets - 10 reps  Standing 3-Way Leg Reach with Resistance at Ankles and Counter Support - 1 x daily - 3 x weekly - 1 sets - 10 reps      12/15: lime green loop band, written + texted ex to her phone    Access Code: [email or web address]  URL: [email or web address]  Date: 12/15/2022  Prepared by: Erica Connelly    Exercises  Supine Gluteal Sets - 1 x daily - 3 x weekly - 1 sets - 5-10 reps - 5 hold  Hooklying Clamshell with Resistance - 1 x daily - 3 x weekly - 1 sets - 10 reps      Access Code: [email or web address]  URL: [email or web address]  Date: 12/13/2022  Prepared by: Meg     Exercises  Supine Lower Trunk Rotation - 1 x daily - 7 x weekly - 2 sets - 10 reps - 3 sec hold  Supine Transversus Abdominis Bracing - Hands on Stomach - 1 x daily - 7 x weekly - 2 sets - 10 reps - 5 sec hold  Supine Piriformis Stretch with Foot on Ground - 1 x daily - 7 x weekly - 3 sets - 15 sec hold  Supine Single Knee to Chest Stretch - 1 x daily - 7 x weekly - 5 sets - 5-10 sec hold  Supine Figure 4 Piriformis Stretch - 1 x daily - 7 x weekly - 3 sets - 15 sec hold  Supine Posterior Pelvic Tilt - 1 x daily - 7 x weekly - 2 sets - 10 reps - 3 sec hold  Supine Hamstring Stretch with Strap - 1 x daily - 7 x weekly - 3 sets - 15 sec hold  Supine March - 1 x daily - 7 x weekly - 2 sets - 10 reps  Supine Bridge - 1 x daily - 7 x weekly - 1 sets - 10 reps  Seated Hamstring Stretch - 1 x daily - 7 x weekly - 3 sets - 20 sec hold    Comprehension of Education:  [x] Verbalizes understanding. [x] Demonstrates understanding. [] Needs review. [x] Demonstrates/verbalizes HEP/Ed previously given. Plan: [x] Continue current frequency toward long and short term goals.     [] Specific Instructions for subsequent treatments:       Time In: 10:28 am          Time Out: 11:30 am     Electronically signed by:  Ronda Cain PT

## 2022-12-29 NOTE — PATIENT INSTRUCTIONS
[address], 1215 [facility], [place] 200 [facility], [address], 128 [facility]   [facility] 60 Neurology - Myles Jimenez MD   137 [facility], [address] [place] [address], [address] [place] [address],  Pavithra Jaramillo    [facility] Esvin Silva MD   5858 [facility].    [place], [place], [address]

## 2022-12-29 NOTE — PROGRESS NOTES
[] [facility]) - [facility] &  Therapy  [address]  [phone]  F: [phone] [x] [address]  [facility] 36   [address]  P: [phone]  F: [phone] [] [address] &  Therapy  [address]  P: [phone]  F: [phone] [] [address]  P: [phone]  F: [phone] [] [address]  [facility]   [address]   [place]: [phone]  F: [phone]      Physical Therapy Progress Note    Date: 2022      Patient: Lorena Schmitt  : 1990  MRN: [id number]    Physician: ANA Graves                                      Insurance: [employer]  Medical Diagnosis: Sjogren's syndrome with lung involvement; chronic midline LBP without sciatica                        Rehab Codes: M54.59; M25.60; M79.1; R29.3; M62.81  Onset Date: 2020                        Next 's appt.: 22                   Visit# / total visits:           Cancels/No Shows: 1 cx /0  Date range of services: 22 to 22      Subjective:  Pain:  [] Yes  [x] No   Location: LBP              Pain Rating: (0-10 scale)  0/10  Pain altered Tx:  [x] No  [] Yes  Action:  Comments: took a bath due to pain yesterday but feeling well today. A little mm soreness still present. Pt is fatigued after therapy that day. Back is improved. Is using proper body mechanics for lifting. The patient is meeting with the [facility] in the new year to look at the facility and to see if she qualifies for a scholarship for membership. Assessment:  the patient has a good understanding of her home program and is dedicated to performing it. She is using proper body mechanics to decrease strain to her spine.  She is advancing well with a clinic exercise program as well and overall pain levels of her low back have been low or not present. STG: (to be met in 6 treatments)  ? Pain: below 7/10 avg pain level: MET 12/29/22  ? ROM: tolerate flexibility ex for home so that she can manage pain levels MET 12/29/22  ? Strength: tolerate ex for core, spine, hips, LEs for at least 45 minutes without aggravating symptoms so that she can feel more confident with completing ADLs: MET 12/29/22  ? Function: walk through grocery store without needing to rest: DOING BETTER, BUT STILL RESTS 12/29/22  Patient to be independent with home exercise program as demonstrated by performance with correct form without cues. MET 12/29/22     LTG: (to be met in 12 treatments)  Below 5/10 avg pain for improved quality of life  (I) with HEP and progress to community based exercise program so that she can continue with the gains made in PT  Have at least 4/5 strength in affected mm so that she has less pain and improved postural support for improved endurance with ADLs  Be able to lift laundry basket and carry it to her apartment with minimal pain     Patient goals:\"gain mobility for independence\"    Treatment Plan:  [x] Therapeutic Exercise   20169    [x] Therapeutic Activity  84001   [x] Instruction in HEP        Patient Status:     [x] Continue per initial plan of care. Electronically signed by Becky Yip PT on 12/29/2022 at 12:06 PM      If you have any questions or concerns, please don't hesitate to call.   Thank you for your referral.

## 2023-01-04 ENCOUNTER — HOSPITAL ENCOUNTER (OUTPATIENT)
Dept: PHYSICAL THERAPY | Facility: CLINIC | Age: 33
Setting detail: THERAPIES SERIES
Discharge: HOME | End: 2023-01-04

## 2023-01-04 NOTE — FLOWSHEET NOTE
[] [address]  [address]    [phone]  F: [phone]   [x] 84[address] [facility] 36   [address]  P: [phone]  F: [phone]  [] [address] &  Therapy  [address] [facility] [address]  P: [phone]  F: [phone] [] [address]  P: [phone]  F: [phone]  [] [address]  [address] [facility] 70   [address]   [place]: [phone]  F: [phone]   [] [address]  [place]: [phone]   F: [phone]     Physical Therapy Cancel/No Show note    Date: 2023  Patient: Yudy Gaytan  : 1990  MRN: [id number]    Cancels/No Shows to date: 2 cx    For today's appointment patient:      [x]  Cancelled     Reason given by patient:        [x] Other:   doesn't feel she will do well today      [x] Next appointment was confirmed 23    Electronically signed by: Jaylene Pillai PT

## 2023-01-09 ENCOUNTER — HOSPITAL ENCOUNTER (OUTPATIENT)
Dept: PHYSICAL THERAPY | Facility: CLINIC | Age: 33
Setting detail: THERAPIES SERIES
Discharge: HOME | End: 2023-01-09
Payer: COMMERCIAL

## 2023-01-09 PROCEDURE — 97110 THERAPEUTIC EXERCISES: CPT

## 2023-01-09 PROCEDURE — 97140 MANUAL THERAPY 1/> REGIONS: CPT

## 2023-01-09 NOTE — FLOWSHEET NOTE
[] [facility] [address].  [address]  [phone]  F: [phone] [x] [address]  [facility] 36   [address]  P: [phone]  F: [phone] [] [place] &  Therapy  [address] [facility] [address]  P: [phone]  F: [phone] [] [address]  P: [phone]  F: [phone] [] [address]  [facility]   [address]   [place]marie Brine: [phone]  F: [phone]      Physical Therapy Daily Treatment Note    Date:  2023  Patient Name:  Lili Gu  \"vashaun\" (Short a) :  1990  MRN: [id number]   Physician: ANA Ibarra                                      Insurance: [employer]  Medical Diagnosis: Sjogren's syndrome with lung involvement; chronic midline LBP without sciatica                        Rehab Codes: M54.59; M25.60; M79.1; R29.3; M62.81  Onset Date: 2020                        Next 's appt.: 22  Next Dr. Radha Barriga:   Visit# / total visits:    Cancels/No Shows: 2/0    Subjective:    Pain:  [] Yes  [x] No Location: LBP  Pain Rating: (0-10 scale)  6/10  Pain altered Tx:  [x] No  [] Yes  Action:  Comments: feeling better this week. Pt was in bed part of last week and thought she would make it to therapy but wasn't able to in the end. The patient has been doing her home program. Pt has appointment at [facility] this week. Objective:  Modalities:   Precautions: bright light can be bothersome; pt with lupus.  Needs to advance gradually so there is no flare-up  Exercises: bolded 23   Access Code: [email or web address]  Exercise Reps/ Time Weight/ Level Comments   Nu step  10'  L2 Progressed time/resistance ; aiming for 50-60 spm; dec time next visit    treadmill 7' 1.0 mph Started 22; inc time next visit sitting      Stool scoot fwd/back 25 ft ea 2 Started 12/29/22                     Supine          Hooklying TrA 15x5\"       Alt arm lift 5ea A Started 12/15/22   Alt leg lift 5 ea A Started 12/15/22   SLR+TA 10xea  Progressed 12/29/22 marches + TA 10x     Alt arm/opp leg + TA 10A A Started 12/29/22   PPT 10x3\"             LTR  10x3\"     HS stretch  3x20\" Strap  With active df/pf on 12/15/22   Hooklying hip abd  15x Lime     Bent knee drop out+TA 10 ea A Started 12/15/22- inc ROM   Piriformis stretch  2x15\" ea way   FIG 4, to opp shoulder    Bridges 15x Lime  Small range    Gluteal, ta contractions 5x 5 sec With breathing. Added 12/15/22   Hip add 10x5\" Ball  New 12/27         SL clamshells  10xea  New 12/20         Standing   Hand rails    3 way hip  10xea  Progressed to 3 way 12/27   Heel raise/toe 15x  Progressed 12/29/22 March  10x  With ta contraction   Step-ups 15ea 4\" Progressed 1/9/23   Step down 10 ea 4\" Started 1/9/23   Calf stretch on  wedge 1 1 min Started 12/29/22   Calf stretch on step 1 ea 30 sec Started 12/29/22   Side walk in slight knee flexion 15 ft ea way  With ta contraction; started 12/29/22   Standing plank on door 1 1 min Started 12/29/22   Pal-off 15 Double lime Progressed 1/9/23   Other:therapeutic activities: 12/13: instructed in lifting mechanics; 12/15: pt demonstrates proper form     Manual therapy: assessment and treatment of iliolumbar ligaments. L L5 was restricted and very tender. This was released. Worked on surrounding ligaments as well. L L5/S1 and L L3/L4 reelase performed in side lying with active movement. R side also released. Prone dural tube stretch from occiput to sacrum, sacrum to T8 and sacrum to L3. Supine evaluation and treatment to posterior longitudinal ligament released with tightness on L primarily at L5 and L3. Released with active movement LE. Ended with dural tube release.      Specific Instructions for next treatment: start with nu-step, progress to treadmill. Core, spine, hip strengthening, UE, LE flexibility, spine flexibility; instruct in proper lifting mechanics. Martha Torres to do manual as needed (dural tube stretch-others as needed)  ADD additional machines that she may be able to do at the [facility] should she get a membership there. Treatment Charges: Mins Units   []  Modalities     [x]  Ther Exercise 20 1   [x]  Manual Therapy 20 2   []  Ther Activities     []  Aquatics     []  Vasocompression     []  Other     Total Treatment time 40 3       Assessment: [x] Progressing toward goals. Pt entered the clinic with 6/10 pain in lumbar spine. She completed the standing exercises to improve standing tolerance and core strength in this functional position. The rest of the session was spent doing manual work to address the pain levels. The patient was found to have much tightness, tenderness especially on the L side, especially at L5. Pt had good releases and felt good with the dural tube stretch. The patient left the clinic with 0/10 pain. [] No change. [] Other:  [x] Patient would continue to benefit from skilled physical therapy services in order to: reduce pain, improve posture and strength of core and extremities so that she performs her ADLs with greater confidence and less pain. Problem list, as detailed above:   [x] ? Back Pain: 7/10 lumbar, shoulders, wrists and knees                             [x] ? ROM: mild tightness throughout               [x] ? Strength:  Core, spine weakness, R more than L extremities especially the hips/LEs are weak  [x] ? Function: limited by pain, though she pushes through it  [x] Postural Deviations: increased lordosis lumbar        STG: (to be met in 6 treatments)  ? Pain: below 7/10 avg pain level: MET 12/29/22  ? ROM: tolerate flexibility ex for home so that she can manage pain levels MET 12/29/22  ?  Strength: tolerate ex for core, spine, hips, LEs for at least 45 minutes without aggravating symptoms so that she can feel more confident with completing ADLs: MET 12/29/22  ? Function: walk through grocery store without needing to rest: DOING BETTER, BUT STILL RESTS 12/29/22  Patient to be independent with home exercise program as demonstrated by performance with correct form without cues. MET 12/29/22     LTG: (to be met in 12 treatments)  Below 5/10 avg pain for improved quality of life  (I) with HEP and progress to community based exercise program so that she can continue with the gains made in PT  Have at least 4/5 strength in affected mm so that she has less pain and improved postural support for improved endurance with ADLs  Be able to lift laundry basket and carry it to her apartment with minimal pain     Patient goals:\"gain mobility for independence\"    Pt. Education:  [] Yes  [] No  [x] Reviewed Prior HEP/Ed  Method of Education: [] Verbal reduce UT and trunk compensation  [x] Demo   [] Written:     Access Code: [email or web address]  URL: [email or web address]  Date: 12/29/2022  Prepared by: Zuly Patel    Exercises  Standing Hamstring Stretch with Step - 1 x daily - 7 x weekly - 1 sets - 2 reps - 30 hold  Gastroc Stretch on Step - 1 x daily - 7 x weekly - 1 sets - 2 reps - 30 hold  Sidestepping - 1 x daily - 3 x weekly - 1 sets  Forearm Plank on Wall - 1 x daily - 3 x weekly - 1 sets - 1 reps - 60 sec hold  Heel Toe Raises with Counter Support - 1 x daily - 3 x weekly - 1-2 sets - 10 reps  Standing 3-Way Leg Reach with Resistance at Ankles and Counter Support - 1 x daily - 3 x weekly - 1 sets - 10 reps      12/15: lime green loop band, written + texted ex to her phone    Access Code: [email or web address]  URL: [email or web address]  Date: 12/15/2022  Prepared by: Zuly Patel    Exercises  Supine Gluteal Sets - 1 x daily - 3 x weekly - 1 sets - 5-10 reps - 5 hold  Hooklying Clamshell with Resistance - 1 x daily - 3 x weekly - 1 sets - 10 reps      Access Code: [email or web address]  URL: [email or web address]  Date: 12/13/2022  Prepared by: Aaron Reed     Exercises  Supine Lower Trunk Rotation - 1 x daily - 7 x weekly - 2 sets - 10 reps - 3 sec hold  Supine Transversus Abdominis Bracing - Hands on Stomach - 1 x daily - 7 x weekly - 2 sets - 10 reps - 5 sec hold  Supine Piriformis Stretch with Foot on Ground - 1 x daily - 7 x weekly - 3 sets - 15 sec hold  Supine Single Knee to Chest Stretch - 1 x daily - 7 x weekly - 5 sets - 5-10 sec hold  Supine Figure 4 Piriformis Stretch - 1 x daily - 7 x weekly - 3 sets - 15 sec hold  Supine Posterior Pelvic Tilt - 1 x daily - 7 x weekly - 2 sets - 10 reps - 3 sec hold  Supine Hamstring Stretch with Strap - 1 x daily - 7 x weekly - 3 sets - 15 sec hold  Supine March - 1 x daily - 7 x weekly - 2 sets - 10 reps  Supine Bridge - 1 x daily - 7 x weekly - 1 sets - 10 reps  Seated Hamstring Stretch - 1 x daily - 7 x weekly - 3 sets - 20 sec hold    Comprehension of Education:  [] Verbalizes understanding. [] Demonstrates understanding. [] Needs review. [x] Demonstrates/verbalizes HEP/Ed previously given. Plan: [x] Continue current frequency toward long and short term goals.     [] Specific Instructions for subsequent treatments:       Time In: 10:00 am          Time Out: 11:00 am     Electronically signed by:  Chucho Caballero PT

## 2023-01-11 ENCOUNTER — HOSPITAL ENCOUNTER (OUTPATIENT)
Dept: PHYSICAL THERAPY | Facility: CLINIC | Age: 33
Setting detail: THERAPIES SERIES
Discharge: HOME | End: 2023-01-11
Payer: COMMERCIAL

## 2023-01-11 PROCEDURE — 97110 THERAPEUTIC EXERCISES: CPT

## 2023-01-11 NOTE — FLOWSHEET NOTE
[] [facility]  [address]  [phone]  F: [phone] [x] [address]  [place] [address]   [address]  P: [phone]  F: [phone] [] [address]  [address]  P: [phone]  F: [phone] [] [address]  P: [phone]  F: [phone] [] [address]  [facility]   [address]   [place]: [phone]  F: [phone]      Physical Therapy Daily Treatment Note    Date:  2023  Patient Name:  Gal Reynaga  \"vickhaun\" (Short a) :  1990  MRN: [id number]   Physician: ANA Santiago                                      Insurance: [facility]  Medical Diagnosis: Sjogren's syndrome with lung involvement; chronic midline LBP without sciatica                        Rehab Codes: M54.59; M25.60; M79.1; R29.3; M62.81  Onset Date: 2020                        Next 's appt.: 22  Next Dr. Korina Aaron:   Visit# / total visits:    Cancels/No Shows: 2/0    Subjective:    Pain:  [x] Yes  [] No Location: LBP  Pain Rating: (0-10 scale)  5/10 overall   Pain altered Tx:  [x] No  [] Yes  Action:  Comments: Pt arrives noting yesterday she [place] [employer] to talk with a  for an hour and this took a lot out of her needing to go home and sleep. Feels better this date rating overall pain 5/10. 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 Objective:  Modalities:   Precautions: bright light can be bothersome; pt with lupus.  Needs to advance gradually so there is no flare-up  Exercises: bolded 23   Access Code: [email or web address]  Exercise Reps/ Time Weight/ Level Comments   Nu step 6'  L2 Progressed time/resistance ; aiming for 50-60 spm; dec time    treadmill 15' 1.0 mph Started 12/29/22; inc time 1/11         sitting      Stool scoot fwd/back 25 ft ea 2 Started 12/29/22                     Supine          Hooklying TrA 15x5\"       Alt arm lift 5ea A Started 12/15/22   Alt leg lift 5 ea A Started 12/15/22   SLR+TA 10xea  Progressed 12/29/22   marches + TA 10x     Alt arm/opp leg + TA 10x A Started 12/29/22   PPT 10x3\"             LTR  10x3\"     HS stretch  3x20\" Strap  With active df/pf on 12/15/22   Hooklying hip abd  2x10 Lime     Bent knee drop out+TA 10 ea A Started 12/15/22- inc ROM   Piriformis stretch  2x15\" ea way   FIG 4, to opp shoulder    Bridges 2x10 Lime  Small range    Gluteal, ta contractions 5x 5 sec With breathing. Added 12/15/22   Hip add 10x5\" Ball  New 12/27         SL clamshells  10xea  New 12/20         Standing   Hand rails    3 way hip  10x ea  Progressed to 3 way 12/27   Heel raise/toe 15x  Progressed 12/29/22 March  10x  With ta contraction   Step-ups 15ea 4\" Progressed 1/9/23   Step down 15 ea 4\" Started 1/9/23; incr reps 1/11   Calf stretch on  wedge 1 1 min Started 12/29/22   Calf stretch on step 1 ea 30 sec Started 12/29/22   Side walk in slight knee flexion 15 ft ea way  With ta contraction; started 12/29/22   Standing plank on door 1 1 min Started 12/29/22   Pal-off press 15 Double lime Progressed 1/9/23   Paloff walk outs  add                 Other:therapeutic activities: 12/13: instructed in lifting mechanics; 12/15: pt demonstrates proper form     Not today: Manual therapy: assessment and treatment of iliolumbar ligaments. L L5 was restricted and very tender. This was released. Worked on surrounding ligaments as well. L L5/S1 and L L3/L4 reelase performed in side lying with active movement. R side also released. Prone dural tube stretch from occiput to sacrum, sacrum to T8 and sacrum to L3. Supine evaluation and treatment to posterior longitudinal ligament released with tightness on L primarily at L5 and L3. Released with active movement LE.  Ended with dural tube release. Specific Instructions for next treatment: start with nu-step, progress to treadmill. Core, spine, hip strengthening, UE, LE flexibility, spine flexibility; instruct in proper lifting mechanics. Vianney Bowl to do manual as needed (dural tube stretch-others as needed)  ADD additional machines that she may be able to do at the [facility] should she get a membership there. Treatment Charges: Mins Units   []  Modalities     [x]  Ther Exercise 60 4   []  Manual Therapy     []  Ther Activities     []  Aquatics     []  Vasocompression     []  Other     Total Treatment time 60 4       Assessment: [x] Progressing toward goals. Initiated session with nu step followed by increased time on treadmill to progress strength and endurance. Pt felt good with warm up this date. Completed mat exercises progressing reps for resisted bridges and clamshells. Encouraged upright posture with standing activities. All exercises completed slow and controlled today. Progressed reps for stepping activities. Pt tolerated session well without any complaints of increased pain levels. [] No change. [] Other:  [x] Patient would continue to benefit from skilled physical therapy services in order to: reduce pain, improve posture and strength of core and extremities so that she performs her ADLs with greater confidence and less pain. Problem list, as detailed above:   [x] ? Back Pain: 7/10 lumbar, shoulders, wrists and knees                             [x] ? ROM: mild tightness throughout               [x] ? Strength:  Core, spine weakness, R more than L extremities especially the hips/LEs are weak  [x] ? Function: limited by pain, though she pushes through it  [x] Postural Deviations: increased lordosis lumbar        STG: (to be met in 6 treatments)  ? Pain: below 7/10 avg pain level: MET 12/29/22  ? ROM: tolerate flexibility ex for home so that she can manage pain levels MET 12/29/22  ?  Strength: tolerate ex for core, spine, hips, LEs for at least 45 minutes without aggravating symptoms so that she can feel more confident with completing ADLs: MET 12/29/22  ? Function: walk through grocery store without needing to rest: DOING BETTER, BUT STILL RESTS 12/29/22  Patient to be independent with home exercise program as demonstrated by performance with correct form without cues. MET 12/29/22     LTG: (to be met in 12 treatments)  Below 5/10 avg pain for improved quality of life  (I) with HEP and progress to community based exercise program so that she can continue with the gains made in PT  Have at least 4/5 strength in affected mm so that she has less pain and improved postural support for improved endurance with ADLs  Be able to lift laundry basket and carry it to her apartment with minimal pain     Patient goals:\"gain mobility for independence\"    Pt. Education:  [] Yes  [x] No  [] Reviewed Prior HEP/Ed  Method of Education: [] Verbal reduce UT and trunk compensation  [] Demo   [] Written:     Access Code: [email or web address]  URL: [email or web address]  Date: 12/29/2022  Prepared by: Keith Waterville    Exercises  Standing Hamstring Stretch with Step - 1 x daily - 7 x weekly - 1 sets - 2 reps - 30 hold  Gastroc Stretch on Step - 1 x daily - 7 x weekly - 1 sets - 2 reps - 30 hold  Sidestepping - 1 x daily - 3 x weekly - 1 sets  Forearm Plank on Wall - 1 x daily - 3 x weekly - 1 sets - 1 reps - 60 sec hold  Heel Toe Raises with Counter Support - 1 x daily - 3 x weekly - 1-2 sets - 10 reps  Standing 3-Way Leg Reach with Resistance at Ankles and Counter Support - 1 x daily - 3 x weekly - 1 sets - 10 reps      12/15: lime green loop band, written + texted ex to her phone    Access Code: [email or web address]  URL: [email or web address]/  Date: 12/15/2022  Prepared by: Keith Waterville    Exercises  Supine Gluteal Sets - 1 x daily - 3 x weekly - 1 sets - 5-10 reps - 5 hold  Hooklying Clamshell with Resistance - 1 x daily - 3 x weekly - 1 sets - 10 reps      Access Code: [email or web address]  URL: [email or web address] [email or web address]/  Date: 12/13/2022  Prepared by: Meg     Exercises  Supine Lower Trunk Rotation - 1 x daily - 7 x weekly - 2 sets - 10 reps - 3 sec hold  Supine Transversus Abdominis Bracing - Hands on Stomach - 1 x daily - 7 x weekly - 2 sets - 10 reps - 5 sec hold  Supine Piriformis Stretch with Foot on Ground - 1 x daily - 7 x weekly - 3 sets - 15 sec hold  Supine Single Knee to Chest Stretch - 1 x daily - 7 x weekly - 5 sets - 5-10 sec hold  Supine Figure 4 Piriformis Stretch - 1 x daily - 7 x weekly - 3 sets - 15 sec hold  Supine Posterior Pelvic Tilt - 1 x daily - 7 x weekly - 2 sets - 10 reps - 3 sec hold  Supine Hamstring Stretch with Strap - 1 x daily - 7 x weekly - 3 sets - 15 sec hold  Supine March - 1 x daily - 7 x weekly - 2 sets - 10 reps  Supine Bridge - 1 x daily - 7 x weekly - 1 sets - 10 reps  Seated Hamstring Stretch - 1 x daily - 7 x weekly - 3 sets - 20 sec hold    Comprehension of Education:  [] Verbalizes understanding. [] Demonstrates understanding. [] Needs review. [x] Demonstrates/verbalizes HEP/Ed previously given. Plan: [x] Continue current frequency toward long and short term goals.     [] Specific Instructions for subsequent treatments:       Time In: 11:00 am          Time Out: 12:06 pm     Electronically signed by:  Maury Dakins, PTA

## 2023-01-16 ENCOUNTER — HOSPITAL ENCOUNTER (OUTPATIENT)
Dept: PHYSICAL THERAPY | Facility: CLINIC | Age: 33
Setting detail: THERAPIES SERIES
Discharge: HOME | End: 2023-01-16
Payer: COMMERCIAL

## 2023-01-16 NOTE — FLOWSHEET NOTE
[] [facility]  [address]    [phone]  F: [phone]   [x] [address] [facility] [address]   [address]  P: [phone]  F: [phone]  [] [address] &  Therapy  [address] [facility]  P: [phone]  F: [phone] [] [address]  P: [phone]  F: [phone]  [] [address]  [facility]   [place]: [phone]  F: [phone]   [] [address]  [place]: [phone]   F: [phone]     Physical Therapy Cancel/No Show note    Date: 2023  Patient: Xiomara Cunningham  : 1990  MRN: [id number]    Cancels/No Shows to date: 3 cx    For today's appointment patient:      [x]  Cancelled     Reason given by patient:       [x] No transportation          [x] Next appointment was confirmed 23    Electronically signed by: Renny Hargrove PT

## 2023-01-18 ENCOUNTER — HOSPITAL ENCOUNTER (OUTPATIENT)
Dept: PHYSICAL THERAPY | Facility: CLINIC | Age: 33
Setting detail: THERAPIES SERIES
Discharge: HOME | End: 2023-01-18
Payer: COMMERCIAL

## 2023-01-18 ENCOUNTER — HOSPITAL ENCOUNTER (OUTPATIENT)
Age: 33
Setting detail: SPECIMEN
Discharge: HOME | End: 2023-01-18

## 2023-01-18 ENCOUNTER — OFFICE VISIT (OUTPATIENT)
Dept: OBGYN CLINIC | Age: 33
End: 2023-01-18
Payer: COMMERCIAL

## 2023-01-18 VITALS
BODY MASS INDEX: 45.25 KG/M2 | DIASTOLIC BLOOD PRESSURE: 89 MMHG | WEIGHT: 263.6 LBS | HEART RATE: 104 BPM | SYSTOLIC BLOOD PRESSURE: 128 MMHG

## 2023-01-18 DIAGNOSIS — Z01.419 WELL WOMAN EXAM WITH ROUTINE GYNECOLOGICAL EXAM: Primary | ICD-10-CM

## 2023-01-18 PROCEDURE — 99385 PREV VISIT NEW AGE 18-39: CPT | Performed by: STUDENT IN AN ORGANIZED HEALTH CARE EDUCATION/TRAINING PROGRAM

## 2023-01-18 PROCEDURE — G8484 FLU IMMUNIZE NO ADMIN: HCPCS | Performed by: STUDENT IN AN ORGANIZED HEALTH CARE EDUCATION/TRAINING PROGRAM

## 2023-01-18 PROCEDURE — 97110 THERAPEUTIC EXERCISES: CPT

## 2023-01-18 NOTE — FLOWSHEET NOTE
[] [facility]  Outpatient Rehabilitation &  Therapy  [address]  [phone]  F: [phone] [x] [facility]  Outpatient Rehabilitation &  Therapy  [address]   [address]  P: [phone]  F: [phone] [] [facility]  Outpatient Rehabilitation &  Therapy  [address]  P: [phone]  F: [phone] [] [facility]  Outpatient Rehabilitation &  Therapy  [address]  P: [phone]  F: [phone] [] [facility]  Outpatient Rehabilitation &  Therapy  [address]   [address]   P: [phone]  F: [phone]      Physical Therapy Daily Treatment Note    Date:  2023  Patient Name:  Stephanie Dave  \"bhanu\" (Short a) :  1990  MRN: [id number]   Physician: ANA Anderson                                      Insurance: [employer]  Medical Diagnosis: Sjogren's syndrome with lung involvement; chronic midline LBP without sciatica                        Rehab Codes: M54.59; M25.60; M79.1; R29.3; M62.81  Onset Date: 2020                        Next 's appt.: 22  Next  Appt:   Visit# / total visits:    Cancels/No Shows: 3/0    Subjective:    Pain:  [x] Yes  [] No Location: LBP  Pain Rating: (0-10 scale)  fatigue/10 overall   Pain altered Tx:  [x] No  [] Yes  Action:  Comments: Pt did some walking outside on  and Monday. Has been compliant with stretches. Pt without pain this date but is pretty fatigued. Mentions she now has a membership to the [employer].     Objective:  Modalities:   Precautions: bright light can be bothersome; pt with lupus. Needs to advance gradually so there is no flare-up  Exercises: bolded 23   Access Code: [email or web address]  Exercise Reps/ Time Weight/ Level Comments   Nu step 6'  L3 Progressed time/resistance ; aiming for 50-60 spm; dec time ; incr level    Treadmill 10' 1.2 mph Started 22; inc time , inc speed   sitting      Stool scoot fwd/back 25 ft ea 2 Started 12/29/22                     Supine          Hooklying TrA 15x5\"       Alt arm lift 5ea A Started 12/15/22   Alt leg lift 5 ea A Started 12/15/22   SLR+TA 10xea  Progressed 12/29/22   marches + TA 10x     Alt arm/opp leg + TA 10x A Started 12/29/22   PPT 10x3\"             LTR  10x3\"     HS stretch  3x20\" Strap  With active df/pf on 12/15/22   Hooklying hip abd  2x10 Lime     Bent knee drop out+TA 10 ea A Started 12/15/22- inc ROM   Piriformis stretch  2x15\" ea way   FIG 4, to opp shoulder    Bridges x15 Lime  Small range    Gluteal, ta contractions 5x 5 sec With breathing. Added 12/15/22   Hip add 10x5\" Ball  New 12/27         SL clamshells  10xea  New 12/20         Standing   Hand rails    3 way hip +TA 10x ea  Progressed to 3 way 12/27   Heel raise/toe 15x  Progressed 12/29/22 March  10x  With ta contraction   Step-ups 15ea 4\" Progressed 1/9/23   Step down 15ea 4\" Started 1/9/23; incr reps 1/11   Calf stretch on  wedge 1 1 min Started 12/29/22   Calf stretch on step 1 ea 30 sec Started 12/29/22   Side walk in slight knee flexion 15 ft ea way  With ta contraction; started 12/29/22   Standing plank on door 1 1 min Started 12/29/22   Pal-off press 15 Double lime Progressed 1/9/23   Paloff walk outs  10x Double lime  New 1/18   Shawnee Reece  ADD           Other:therapeutic activities: 12/13: instructed in lifting mechanics; 12/15: pt demonstrates proper form     Not today: Manual therapy: assessment and treatment of iliolumbar ligaments. L L5 was restricted and very tender. This was released. Worked on surrounding ligaments as well. L L5/S1 and L L3/L4 reelase performed in side lying with active movement. R side also released. Prone dural tube stretch from occiput to sacrum, sacrum to T8 and sacrum to L3. Supine evaluation and treatment to posterior longitudinal ligament released with tightness on L primarily at L5 and L3. Released with active movement LE. Ended with dural tube release. Specific Instructions for next treatment: start with nu-step, progress to treadmill. Core, spine, hip strengthening, UE, LE flexibility, spine flexibility; instruct in proper lifting mechanics. Karen Creed to do manual as needed (dural tube stretch-others as needed)  ADD additional machines that she may be able to do at the [facility] should she get a membership there. Treatment Charges: Mins Units   []  Modalities     [x]  Ther Exercise 45 3   []  Manual Therapy     []  Ther Activities     []  Aquatics     []  Vasocompression     []  Other     Total Treatment time 45 3       Assessment: [x] Progressing toward goals. Initiated warm up with increased resistance on nu step followed by increased speed on treadmill this date to progress endurance. Addition of paloff walk outs providing demo's and cues for proper execution to challenge core stability. Plan to incorporate weight machines as able in upcoming sessions to help develop gym routine. Tolerated session well. [] No change. [] Other:  [x] Patient would continue to benefit from skilled physical therapy services in order to: reduce pain, improve posture and strength of core and extremities so that she performs her ADLs with greater confidence and less pain. Problem list, as detailed above:   [x] ? Back Pain: 7/10 lumbar, shoulders, wrists and knees                             [x] ? ROM: mild tightness throughout               [x] ? Strength:  Core, spine weakness, R more than L extremities especially the hips/LEs are weak  [x] ? Function: limited by pain, though she pushes through it  [x] Postural Deviations: increased lordosis lumbar        STG: (to be met in 6 treatments)  ? Pain: below 7/10 avg pain level: MET 12/29/22  ? ROM: tolerate flexibility ex for home so that she can manage pain levels MET 12/29/22  ?  Strength: tolerate ex for core, spine, hips, LEs for at least 45 minutes without aggravating symptoms so that she can feel more confident with completing ADLs: MET 12/29/22  ? Function: walk through grocery store without needing to rest: DOING BETTER, BUT STILL RESTS 12/29/22  Patient to be independent with home exercise program as demonstrated by performance with correct form without cues. MET 12/29/22     LTG: (to be met in 12 treatments)  Below 5/10 avg pain for improved quality of life  (I) with HEP and progress to community based exercise program so that she can continue with the gains made in PT  Have at least 4/5 strength in affected mm so that she has less pain and improved postural support for improved endurance with ADLs  Be able to lift laundry basket and carry it to her apartment with minimal pain     Patient goals:\"gain mobility for independence\"    Pt. Education:  [] Yes  [x] No  [] Reviewed Prior HEP/Ed  Method of Education: [] Verbal reduce UT and trunk compensation  [] Demo   [] Written:     Access Code: [email or web address]  URL: [email or web address]  Date: 12/29/2022  Prepared by: Kostas Vazquez    Exercises  Standing Hamstring Stretch with Step - 1 x daily - 7 x weekly - 1 sets - 2 reps - 30 hold  Gastroc Stretch on Step - 1 x daily - 7 x weekly - 1 sets - 2 reps - 30 hold  Sidestepping - 1 x daily - 3 x weekly - 1 sets  Forearm Plank on Wall - 1 x daily - 3 x weekly - 1 sets - 1 reps - 60 sec hold  Heel Toe Raises with Counter Support - 1 x daily - 3 x weekly - 1-2 sets - 10 reps  Standing 3-Way Leg Reach with Resistance at Ankles and Counter Support - 1 x daily - 3 x weekly - 1 sets - 10 reps      12/15: lime green loop band, written + texted ex to her phone    Access Code: [email or web address]  URL: [email or web address]  Date: 12/15/2022  Prepared by: Kostas Vazquez    Exercises  Supine Gluteal Sets - 1 x daily - 3 x weekly - 1 sets - 5-10 reps - 5 hold  Hooklying Clamshell with Resistance - 1 x daily - 3 x weekly - 1 sets - 10 reps      Access Code: [email or web address]  URL: [email or web address]  Date: 12/13/2022  Prepared by: Rachel Gonzalez     Exercises  Supine Lower Trunk Rotation - 1 x daily - 7 x weekly - 2 sets - 10 reps - 3 sec hold  Supine Transversus Abdominis Bracing - Hands on Stomach - 1 x daily - 7 x weekly - 2 sets - 10 reps - 5 sec hold  Supine Piriformis Stretch with Foot on Ground - 1 x daily - 7 x weekly - 3 sets - 15 sec hold  Supine Single Knee to Chest Stretch - 1 x daily - 7 x weekly - 5 sets - 5-10 sec hold  Supine Figure 4 Piriformis Stretch - 1 x daily - 7 x weekly - 3 sets - 15 sec hold  Supine Posterior Pelvic Tilt - 1 x daily - 7 x weekly - 2 sets - 10 reps - 3 sec hold  Supine Hamstring Stretch with Strap - 1 x daily - 7 x weekly - 3 sets - 15 sec hold  Supine March - 1 x daily - 7 x weekly - 2 sets - 10 reps  Supine Bridge - 1 x daily - 7 x weekly - 1 sets - 10 reps  Seated Hamstring Stretch - 1 x daily - 7 x weekly - 3 sets - 20 sec hold    Comprehension of Education:  [] Verbalizes understanding. [] Demonstrates understanding. [] Needs review. [x] Demonstrates/verbalizes HEP/Ed previously given. Plan: [x] Continue current frequency toward long and short term goals.     [] Specific Instructions for subsequent treatments:       Time In: 11:00 am          Time Out: 11:51 am     Electronically signed by:  Josie Ramos PTA

## 2023-01-18 NOTE — PROGRESS NOTES
[facility] Obstetrics and Gynecology  [address] [place], [place]  1/18/2023                       Primary Care Physician: SWATI Almazan CNP    CC:   Chief Complaint   Patient presents with    Annual Exam         HPI: Stacey Rondon is a 27 y.o. female No obstetric history on file. Patient's last menstrual period was 01/04/2023. The patient was seen and examined. She is here for an annual visit. She is complaining of nothing. She denies irregular/heavy bleeding and dysmenorrhea. Her periods are regular and last 5 days. She describes them as moderate. Her bowel habits are regular. She denies any bloating. She denies dysuria. She denies urinary leaking. She denies vaginal discharge. She is sexually active with has sex with females. She uses no method for contraception and is not desiring pregnancy.     Depression Screen: Symptoms of decreased mood absent  Symptoms of anhedonia absent  **If either question is answered in a  positive fashion then complete the PHQ9 Scoring Evaluation and make the appropriate referral**    REVIEW OF SYSTEMS:   Constitutional: negative fever, negative chills  HEENT: negative visual disturbances, negative headaches  Respiratory: negative dyspnea, negative cough  Cardiovascular: negative chest pain,  negative palpitations  Gastrointestinal: negative abdominal pain, negative RUQ pain, negative N/V, negative diarrhea, negative constipation  Genitourinary: negative dysuria, negative vaginal discharge  Dermatological: negative rash  Hematologic: negative bruising  Immunologic/Lymphatic: negative recent illness, negative recent sick contact  Musculoskeletal: negative back pain, negative myalgias, negative arthralgias  Neurological:  negative dizziness, negative weakness  Behavior/Psych: negative depression, negative anxiety      GYNECOLOGICAL HISTORY:  Age of Menarche: 13  Age of Menopause: N/A     STD History: no past history    Permanent Sterilization: no  Reversible Birth Control: no  Hormone Replacement Exposure: no    OBSTETRICAL HISTORY:  OB History   No obstetric history on file. PAST MEDICAL HISTORY:   has no past medical history on file. PAST SURGICAL HISTORY:   has no past surgical history on file. ALLERGIES:  is allergic to acetaminophen-codeine and codeine. MEDICATIONS:  Prior to Admission medications    Medication Sig Start Date End Date Taking? Authorizing Provider   vitamin D (CHOLECALCIFEROL) 83524 UNIT CAPS TAKE 1 CAPSULE BY MOUTH ONE TIME PER WEEK 12/29/22  Yes SWATI Bernal CNP   levETIRAcetam (KEPPRA) 750 MG tablet Take 750 mg by mouth 2 times daily 1/15/21  Yes Historical Provider, MD   cetirizine (ZYRTEC) 10 MG tablet Take 1 tablet by mouth daily as needed for Allergies 11/16/22  Yes SWATI Bernal - CNP   Ferrous Sulfate 324 MG TBEC TAKE 1 TABLET BY MOUTH EVERY DAY 11/16/22  Yes SWATI Bernal CNP   losartan (COZAAR) 25 MG tablet Take 1 tablet by mouth daily 11/16/22  Yes SWATI Bernal - TOMASA   hydroxychloroquine (PLAQUENIL) 200 MG tablet Take 1 tablet by mouth 2 times daily 11/16/22  Yes SWATI Bernal - CNP   mycophenolate (CELLCEPT) 500 MG tablet Take 1,000 mg by mouth 3 times daily 11/23/21 12/29/22  Historical Provider, MD       FAMILY HISTORY:  Family History of Breast, Ovarian, Colon or Uterine Cancer: No   family history includes Brain Cancer in her sister; Cancer in her mother; Coronary Art Dis in her mother; Dementia in her maternal grandmother. SOCIAL HISTORY:   reports that she has never smoked. She has never been exposed to tobacco smoke. She has never used smokeless tobacco. She reports that she does not currently use alcohol. She reports current drug use. Frequency: 7.00 times per week. Drug: Marijuana Kaylee Dinning).     HEALTH MAINTENANCE:  Immunization status: stated as up to date, no records available    [address] [place] [address]: N/A  Colonoscopy: N/A  Pap Smears: Neg per patient. Updated today. Family Planning: None. Female partner. DEXA: N/A    VITALS:  Vitals:    01/18/23 1302   BP: 128/89   Site: Right Lower Arm   Position: Sitting   Cuff Size: Large Adult   Pulse: (!) 104   Weight: 263 lb 9.6 oz (119.6 kg)                                                                                                                                                                                                   PHYSICAL EXAM:   General Appearance: Appears healthy. Alert; in no acute distress. Pleasant. Skin: Skin color, texture, turgor normal. No rashes or lesions. HEENT: normocephalic and atraumatic, Thyroid normal to inspection and palpation  Respiratory: Normal expansion. Clear to auscultation. No rales, rhonchi, or wheezing. Cardiovascular: normal rate, normal S1 and S2, no gallops, intact distal pulses and no carotid bruits  Breast:  (Chest): normal appearance, no masses or tenderness  Abdomen: soft, non-tender, non-distended, no right upper quadrant tenderness and no CVA tenderness  Pelvic Exam:   External genitalia: General appearance; normal, Hair distribution; normal, Lesions absent  Urinary system: urethral meatus normal  Vaginal: normal mucosa, no discharge  Cervix: normal appearing cervix without discharge or lesions  Adnexa: non-palpable  Uterus: normal single, nontender  Rectal Exam: exam declined by patient  Musculoskeletal: no gross abnormalities  Extremities: non-tender BLE and non-edematous  Psych:  oriented to time, place and person     DATA:  No results found for this visit on 01/18/23. ASSESSMENT & PLAN:    Chuck Hull is a 27 y.o. female No obstetric history on file. Patient's last menstrual period was 01/04/2023. Annual:  Mammogram: N/A  Colonoscopy: N/A  Pap Smears: Neg per patient. Updated today. Family Planning: None. Female partner.   DEXA: N/A    Patient Active Problem List    Diagnosis Date Noted PTSD (post-traumatic stress disorder) 11/16/2022     Priority: Medium    SLE (systemic lupus erythematosus related syndrome) ([facility] [address] 11/16/2022     Priority: Medium    Vitamin D deficiency 11/16/2022     Priority: Medium    Chronic midline low back pain without sciatica 11/16/2022     Priority: Medium    Fibromyalgia 11/16/2022     Priority: Medium    Dural venous sinus thrombosis 12/11/2020     Priority: Medium    Anemia of chronic disease 12/09/2020     Priority: Medium    Sjogren's syndrome with lung involvement ([facility] [address] 12/09/2020     Priority: Medium    Seizures ([facility] [address] 12/09/2020     Priority: Medium    Lesion of left frontal lobe of brain 12/06/2020     Priority: Medium       Return in about 1 year (around 1/18/2024) for Annual.  No Patient Care Coordination Note on file. Counseling Completed:    Discussed need for repeat pap as per American Society for Colposcopy and Cervical Pathology guidelines. Discussed need for mammograms every 1 year, If >44 yo and last mammogram was negative. Discussed Calcium and Vitamin D dosing. Discussed need for colonoscopy screening as well as onset for bone density testing. Discussed birth control and barrier recommendations. Discussed STD counseling and prevention. Discussed Gardisil counseling for all patients 10-35 yo. Hereditary Breast, Ovarian, Colon and Uterine Cancer screening discussed. Tobacco & Secondary smoke risks discussed; with recommendation for cessation and avoidance. Routine health maintenance per patients PCP discussed. Diagnosis Orders   1.  Well woman exam with routine gynecological exam  PAP SMEAR           Rosea Aye, DO  [facility], [facility]  1/18/2023, 1:19 PM

## 2023-01-21 LAB
HPV SAMPLE: NORMAL
HPV, GENOTYPE 16: NOT DETECTED
HPV, GENOTYPE 18: NOT DETECTED
HPV, HIGH RISK OTHER: NOT DETECTED
HPV, INTERPRETATION: NORMAL
SPECIMEN DESCRIPTION: NORMAL

## 2023-01-22 DIAGNOSIS — Z76.0 MEDICATION REFILL: ICD-10-CM

## 2023-01-22 DIAGNOSIS — M35.02 SJOGREN'S SYNDROME WITH LUNG INVOLVEMENT (HCC): ICD-10-CM

## 2023-01-23 RX ORDER — HYDROXYCHLOROQUINE SULFATE 200 MG/1
TABLET, FILM COATED ORAL
Qty: 60 TABLET | Refills: 1 | Status: SHIPPED | OUTPATIENT
Start: 2023-01-23

## 2023-02-23 ENCOUNTER — OFFICE VISIT (OUTPATIENT)
Dept: DERMATOLOGY | Age: 33
End: 2023-02-23
Payer: COMMERCIAL

## 2023-02-23 ENCOUNTER — HOSPITAL ENCOUNTER (OUTPATIENT)
Age: 33
Setting detail: SPECIMEN
Discharge: HOME | End: 2023-02-23

## 2023-02-23 VITALS
DIASTOLIC BLOOD PRESSURE: 79 MMHG | HEIGHT: 64 IN | TEMPERATURE: 97.8 F | SYSTOLIC BLOOD PRESSURE: 126 MMHG | BODY MASS INDEX: 45.24 KG/M2 | HEART RATE: 108 BPM | WEIGHT: 265 LBS | OXYGEN SATURATION: 97 %

## 2023-02-23 DIAGNOSIS — L21.9 SEBORRHEIC DERMATITIS: Primary | ICD-10-CM

## 2023-02-23 DIAGNOSIS — Z79.899 HIGH RISK MEDICATION USE: ICD-10-CM

## 2023-02-23 DIAGNOSIS — M32.9 SYSTEMIC LUPUS ERYTHEMATOSUS (SLE) IN ADULT (HCC): ICD-10-CM

## 2023-02-23 DIAGNOSIS — L30.8 OTHER ECZEMA: ICD-10-CM

## 2023-02-23 LAB
ABSOLUTE EOS #: 0.14 K/UL (ref 0–0.44)
ABSOLUTE IMMATURE GRANULOCYTE: 0.03 K/UL (ref 0–0.3)
ABSOLUTE LYMPH #: 1.69 K/UL (ref 1.1–3.7)
ABSOLUTE MONO #: 0.41 K/UL (ref 0.1–1.2)
ALBUMIN SERPL-MCNC: 3.8 G/DL (ref 3.5–5.2)
ALBUMIN/GLOBULIN RATIO: 0.8 (ref 1–2.5)
ALP SERPL-CCNC: 76 U/L (ref 35–104)
ALT SERPL-CCNC: 9 U/L (ref 5–33)
ANION GAP SERPL CALCULATED.3IONS-SCNC: 12 MMOL/L (ref 9–17)
AST SERPL-CCNC: 16 U/L
BASOPHILS # BLD: 0 % (ref 0–2)
BASOPHILS ABSOLUTE: <0.03 K/UL (ref 0–0.2)
BILIRUB SERPL-MCNC: 0.4 MG/DL (ref 0.3–1.2)
BUN SERPL-MCNC: 6 MG/DL (ref 6–20)
CALCIUM SERPL-MCNC: 9.3 MG/DL (ref 8.6–10.4)
CHLORIDE SERPL-SCNC: 98 MMOL/L (ref 98–107)
CO2 SERPL-SCNC: 23 MMOL/L (ref 20–31)
CREAT SERPL-MCNC: 0.61 MG/DL (ref 0.5–0.9)
EOSINOPHILS RELATIVE PERCENT: 3 % (ref 1–4)
GFR SERPL CREATININE-BSD FRML MDRD: >60 ML/MIN/1.73M2
GLUCOSE SERPL-MCNC: 86 MG/DL (ref 70–99)
HCT VFR BLD AUTO: 40.9 % (ref 36.3–47.1)
HGB BLD-MCNC: 12.4 G/DL (ref 11.9–15.1)
IMMATURE GRANULOCYTES: 1 %
LYMPHOCYTES # BLD: 36 % (ref 24–43)
MCH RBC QN AUTO: 26 PG (ref 25.2–33.5)
MCHC RBC AUTO-ENTMCNC: 30.3 G/DL (ref 28.4–34.8)
MCV RBC AUTO: 85.7 FL (ref 82.6–102.9)
MONOCYTES # BLD: 9 % (ref 3–12)
NRBC AUTOMATED: 0 PER 100 WBC
PDW BLD-RTO: 15 % (ref 11.8–14.4)
PLATELET # BLD AUTO: 416 K/UL (ref 138–453)
PMV BLD AUTO: 11.2 FL (ref 8.1–13.5)
POTASSIUM SERPL-SCNC: 3.9 MMOL/L (ref 3.7–5.3)
PROT SERPL-MCNC: 8.4 G/DL (ref 6.4–8.3)
RBC # BLD: 4.77 M/UL (ref 3.95–5.11)
RBC # BLD: ABNORMAL 10*6/UL
SEG NEUTROPHILS: 51 % (ref 36–65)
SEGMENTED NEUTROPHILS ABSOLUTE COUNT: 2.35 K/UL (ref 1.5–8.1)
SODIUM SERPL-SCNC: 133 MMOL/L (ref 135–144)
WBC # BLD AUTO: 4.6 K/UL (ref 3.5–11.3)

## 2023-02-23 PROCEDURE — G8484 FLU IMMUNIZE NO ADMIN: HCPCS | Performed by: DERMATOLOGY

## 2023-02-23 PROCEDURE — 1036F TOBACCO NON-USER: CPT | Performed by: DERMATOLOGY

## 2023-02-23 PROCEDURE — 99204 OFFICE O/P NEW MOD 45 MIN: CPT | Performed by: DERMATOLOGY

## 2023-02-23 PROCEDURE — G8427 DOCREV CUR MEDS BY ELIG CLIN: HCPCS | Performed by: DERMATOLOGY

## 2023-02-23 PROCEDURE — G8417 CALC BMI ABV UP PARAM F/U: HCPCS | Performed by: DERMATOLOGY

## 2023-02-23 RX ORDER — DESONIDE 0.5 MG/G
CREAM TOPICAL
Qty: 60 G | Refills: 2 | Status: SHIPPED | OUTPATIENT
Start: 2023-02-23

## 2023-02-23 RX ORDER — CLOBETASOL PROPIONATE 0.5 MG/G
CREAM TOPICAL
Qty: 60 G | Refills: 2 | Status: SHIPPED | OUTPATIENT
Start: 2023-02-23

## 2023-02-23 RX ORDER — MYCOPHENOLATE MOFETIL 500 MG/1
1000 TABLET ORAL 3 TIMES DAILY
Qty: 180 TABLET | Refills: 13 | Status: CANCELLED | OUTPATIENT
Start: 2023-02-23 | End: 2024-03-30

## 2023-02-23 NOTE — PATIENT INSTRUCTIONS
Blackgirl sunscreen, Supergoop sunscreen    I will also send clobetasol cream for your scalp (apply once daily, do not apply to face) and desonide cream for your face and body (apply twice daily). I will also order labs for you to complete so I can refill your Cellcept.

## 2023-02-24 RX ORDER — MYCOPHENOLATE MOFETIL 250 MG/1
750 CAPSULE ORAL 2 TIMES DAILY
Qty: 60 CAPSULE | Refills: 3 | Status: SHIPPED | OUTPATIENT
Start: 2023-02-24

## 2023-03-25 DIAGNOSIS — Z76.0 MEDICATION REFILL: ICD-10-CM

## 2023-03-25 DIAGNOSIS — M35.02 SJOGREN'S SYNDROME WITH LUNG INVOLVEMENT (HCC): ICD-10-CM

## 2023-03-27 RX ORDER — HYDROXYCHLOROQUINE SULFATE 200 MG/1
TABLET, FILM COATED ORAL
Qty: 60 TABLET | Refills: 1 | Status: SHIPPED | OUTPATIENT
Start: 2023-03-27

## 2023-05-28 DIAGNOSIS — M35.02 SJOGREN'S SYNDROME WITH LUNG INVOLVEMENT (HCC): ICD-10-CM

## 2023-05-28 DIAGNOSIS — Z76.0 MEDICATION REFILL: ICD-10-CM

## 2023-05-28 DIAGNOSIS — Z76.89 ENCOUNTER TO ESTABLISH CARE: ICD-10-CM

## 2023-05-30 ENCOUNTER — OFFICE VISIT (OUTPATIENT)
Dept: FAMILY MEDICINE CLINIC | Age: 33
End: 2023-05-30
Payer: COMMERCIAL

## 2023-05-30 VITALS
HEART RATE: 99 BPM | BODY MASS INDEX: 46.78 KG/M2 | DIASTOLIC BLOOD PRESSURE: 98 MMHG | HEIGHT: 64 IN | SYSTOLIC BLOOD PRESSURE: 154 MMHG | WEIGHT: 274 LBS

## 2023-05-30 DIAGNOSIS — M32.9 SYSTEMIC LUPUS ERYTHEMATOSUS (SLE) IN ADULT (HCC): ICD-10-CM

## 2023-05-30 DIAGNOSIS — G89.29 CHRONIC MIDLINE LOW BACK PAIN WITHOUT SCIATICA: ICD-10-CM

## 2023-05-30 DIAGNOSIS — M54.50 CHRONIC MIDLINE LOW BACK PAIN WITHOUT SCIATICA: ICD-10-CM

## 2023-05-30 DIAGNOSIS — M79.7 FIBROMYALGIA: Primary | ICD-10-CM

## 2023-05-30 PROCEDURE — G8417 CALC BMI ABV UP PARAM F/U: HCPCS | Performed by: NURSE PRACTITIONER

## 2023-05-30 PROCEDURE — G8427 DOCREV CUR MEDS BY ELIG CLIN: HCPCS | Performed by: NURSE PRACTITIONER

## 2023-05-30 PROCEDURE — 1036F TOBACCO NON-USER: CPT | Performed by: NURSE PRACTITIONER

## 2023-05-30 PROCEDURE — 99214 OFFICE O/P EST MOD 30 MIN: CPT | Performed by: NURSE PRACTITIONER

## 2023-05-30 RX ORDER — FERROUS SULFATE TAB EC 324 MG (65 MG FE EQUIVALENT) 324 (65 FE) MG
TABLET DELAYED RESPONSE ORAL
Qty: 30 TABLET | Refills: 5 | OUTPATIENT
Start: 2023-05-30

## 2023-05-30 RX ORDER — HYDROXYCHLOROQUINE SULFATE 200 MG/1
200 TABLET, FILM COATED ORAL 2 TIMES DAILY
Qty: 60 TABLET | Refills: 1 | OUTPATIENT
Start: 2023-05-30

## 2023-05-30 RX ORDER — HYDROXYCHLOROQUINE SULFATE 200 MG/1
TABLET, FILM COATED ORAL
Qty: 60 TABLET | Refills: 1 | OUTPATIENT
Start: 2023-05-30

## 2023-05-30 RX ORDER — MYCOPHENOLATE MOFETIL 250 MG/1
750 CAPSULE ORAL 2 TIMES DAILY
Qty: 60 CAPSULE | Refills: 3 | OUTPATIENT
Start: 2023-05-30

## 2023-05-30 SDOH — ECONOMIC STABILITY: FOOD INSECURITY: WITHIN THE PAST 12 MONTHS, THE FOOD YOU BOUGHT JUST DIDN'T LAST AND YOU DIDN'T HAVE MONEY TO GET MORE.: NEVER TRUE

## 2023-05-30 SDOH — ECONOMIC STABILITY: HOUSING INSECURITY
IN THE LAST 12 MONTHS, WAS THERE A TIME WHEN YOU DID NOT HAVE A STEADY PLACE TO SLEEP OR SLEPT IN A SHELTER (INCLUDING NOW)?: NO

## 2023-05-30 SDOH — ECONOMIC STABILITY: INCOME INSECURITY: HOW HARD IS IT FOR YOU TO PAY FOR THE VERY BASICS LIKE FOOD, HOUSING, MEDICAL CARE, AND HEATING?: NOT HARD AT ALL

## 2023-05-30 SDOH — ECONOMIC STABILITY: FOOD INSECURITY: WITHIN THE PAST 12 MONTHS, YOU WORRIED THAT YOUR FOOD WOULD RUN OUT BEFORE YOU GOT MONEY TO BUY MORE.: NEVER TRUE

## 2023-05-30 ASSESSMENT — ENCOUNTER SYMPTOMS
SINUS PAIN: 0
DIARRHEA: 0
CHEST TIGHTNESS: 0
SHORTNESS OF BREATH: 0
COLOR CHANGE: 0
CONSTIPATION: 0
SINUS PRESSURE: 0
ABDOMINAL PAIN: 0

## 2023-05-30 ASSESSMENT — PATIENT HEALTH QUESTIONNAIRE - PHQ9
2. FEELING DOWN, DEPRESSED OR HOPELESS: 0
SUM OF ALL RESPONSES TO PHQ QUESTIONS 1-9: 0
1. LITTLE INTEREST OR PLEASURE IN DOING THINGS: 0
SUM OF ALL RESPONSES TO PHQ9 QUESTIONS 1 & 2: 0

## 2023-05-30 NOTE — TELEPHONE ENCOUNTER
Susanne Bryan is calling to request a refill on the following medication(s):    Last Visit Date (If Applicable):  38/33/9068    Next Visit Date:    5/30/2023    Medication Request:  Requested Prescriptions     Pending Prescriptions Disp Refills    ferrous sulfate 324 (65 Fe) MG EC tablet [Pharmacy Med Name: FERROUS SULF  MG TABLET] 30 tablet 5     Sig: TAKE 1 TABLET BY MOUTH EVERY DAY

## 2023-05-30 NOTE — PATIENT INSTRUCTIONS
Schedule with specialists    Rheumatology  [facility], [address] [facility] [address] [facility], [address] [place], [address]   [address] Neurology - Coral Alexis MD   137 [facility], [address] [place] [address]   700 [facility], [address], [address]ith[address] Jaramillo     [facility] Esvin Silva MD   [address] [place] [address].    [place], [place], [facility]

## 2023-05-30 NOTE — PROGRESS NOTES
Keila Vidalse is a 26 y.o. female who presents in office today with Self follow up on chronic conditions including:   Patient Active Problem List   Diagnosis    Anemia of chronic disease    Sjogren's syndrome with lung involvement ([facility] [address]    Seizures ([facility] [address]    Lesion of left frontal lobe of brain    PTSD (post-traumatic stress disorder)    Dural venous sinus thrombosis    SLE (systemic lupus erythematosus related syndrome) ([facility])    Vitamin D deficiency    Chronic midline low back pain without sciatica    Fibromyalgia       Chief Complaint   Patient presents with    Forms     Needs disability and air conditioning forms completed         History of Present Illness:     HPI    Here for chronic condition follow up and needing forms completed for disability. Working with  located in [address] [place] [address]. The last few months have been emotional struggle due to death of her father in 2022. Weight gain of 9 lbs. At this time, eating more smoothies and reducing meat, as it negatively impacts her digestion. She did follow up with physical therapy at [facility] and was helpful. Feeling better able to go to the grocery store and pushing cart even if she isn't feel well. Trying to walk at park. Not currently established with therapist at [facility] as previous therapist moved away. Feels well. Has not seen any specialist since last visit. Plans to call for appointments.         Care gaps: COVID-19 vaccine:   no - declines  Colon CA screening:   n/a  Vaccines:   pneumococcal, Tdap  Hepatitis C/HIV screens:   ,   Breast CA screening:   n/a  OB/GYN, cervical CA screening:    normal [facility]  Depression Screenin --> 0    Health Maintenance Due   Topic Date Due    Varicella vaccine (1 of 2 - 2-dose childhood series) Never done    Pneumococcal 0-64 years Vaccine (1 - PCV) Never done    DTaP/Tdap/Td vaccine (1 - Tdap) Never done    Shingles vaccine (1 of 2) Never done        Patient Care

## 2023-06-01 RX ORDER — FERROUS SULFATE 324(65)MG
TABLET, DELAYED RELEASE (ENTERIC COATED) ORAL
Qty: 90 TABLET | Refills: 1 | Status: SHIPPED | OUTPATIENT
Start: 2023-06-01

## 2023-06-01 RX ORDER — HYDROXYCHLOROQUINE SULFATE 200 MG/1
200 TABLET, FILM COATED ORAL 2 TIMES DAILY
Qty: 60 TABLET | Refills: 1 | Status: SHIPPED | OUTPATIENT
Start: 2023-06-01

## 2023-06-19 DIAGNOSIS — Z76.89 ENCOUNTER TO ESTABLISH CARE: ICD-10-CM

## 2023-06-19 RX ORDER — LOSARTAN POTASSIUM 25 MG/1
TABLET ORAL
Qty: 90 TABLET | Refills: 1 | Status: SHIPPED | OUTPATIENT
Start: 2023-06-19

## 2023-06-19 RX ORDER — CETIRIZINE HYDROCHLORIDE 10 MG/1
10 TABLET ORAL DAILY PRN
Qty: 90 TABLET | Refills: 1 | Status: SHIPPED | OUTPATIENT
Start: 2023-06-19

## 2023-06-19 NOTE — TELEPHONE ENCOUNTER
George Tijerina is calling to request a refill on the following medication(s):    Last Visit Date (If Applicable):  4/47/9161    Next Visit Date:    Visit date not found    Medication Request:  Requested Prescriptions     Pending Prescriptions Disp Refills    cetirizine (ZYRTEC) 10 MG tablet [Pharmacy Med Name: CETIRIZINE HCL 10 MG TABLET] 90 tablet 1     Sig: TAKE 1 TABLET BY MOUTH DAILY AS NEEDED FOR ALLERGIES.     losartan (COZAAR) 25 MG tablet [Pharmacy Med Name: LOSARTAN POTASSIUM 25 MG TAB] 90 tablet 1     Sig: TAKE 1 TABLET BY MOUTH EVERY DAY

## 2023-09-15 DIAGNOSIS — L21.9 SEBORRHEIC DERMATITIS: ICD-10-CM

## 2023-09-16 RX ORDER — KETOCONAZOLE 20 MG/ML
SHAMPOO TOPICAL
Qty: 120 ML | Refills: 2 | Status: SHIPPED | OUTPATIENT
Start: 2023-09-16

## 2023-10-02 ENCOUNTER — OFFICE VISIT (OUTPATIENT)
Dept: DERMATOLOGY | Age: 33
End: 2023-10-02
Payer: COMMERCIAL

## 2023-10-02 VITALS
OXYGEN SATURATION: 95 % | TEMPERATURE: 98.7 F | BODY MASS INDEX: 43.71 KG/M2 | HEART RATE: 72 BPM | DIASTOLIC BLOOD PRESSURE: 86 MMHG | HEIGHT: 66 IN | WEIGHT: 272 LBS | SYSTOLIC BLOOD PRESSURE: 132 MMHG

## 2023-10-02 DIAGNOSIS — L30.8 OTHER ECZEMA: ICD-10-CM

## 2023-10-02 DIAGNOSIS — L21.9 SEBORRHEIC DERMATITIS: Primary | ICD-10-CM

## 2023-10-02 PROCEDURE — G8417 CALC BMI ABV UP PARAM F/U: HCPCS | Performed by: DERMATOLOGY

## 2023-10-02 PROCEDURE — 99213 OFFICE O/P EST LOW 20 MIN: CPT | Performed by: DERMATOLOGY

## 2023-10-02 PROCEDURE — G8484 FLU IMMUNIZE NO ADMIN: HCPCS | Performed by: DERMATOLOGY

## 2023-10-02 PROCEDURE — G8427 DOCREV CUR MEDS BY ELIG CLIN: HCPCS | Performed by: DERMATOLOGY

## 2023-10-02 PROCEDURE — 1036F TOBACCO NON-USER: CPT | Performed by: DERMATOLOGY

## 2023-10-02 RX ORDER — CLOBETASOL PROPIONATE 0.46 MG/ML
SOLUTION TOPICAL
Qty: 50 ML | Refills: 5 | Status: SHIPPED | OUTPATIENT
Start: 2023-10-02

## 2023-10-02 RX ORDER — KETOCONAZOLE 20 MG/ML
SHAMPOO TOPICAL
Qty: 120 ML | Refills: 2 | Status: SHIPPED | OUTPATIENT
Start: 2023-10-02

## 2023-10-02 RX ORDER — PIMECROLIMUS 10 MG/G
CREAM TOPICAL
Qty: 30 G | Refills: 3 | Status: SHIPPED | OUTPATIENT
Start: 2023-10-02

## 2023-10-02 NOTE — PROGRESS NOTES
Dermatology Patient Note  [address]  [address] [place] [address] [address] [address]  Dept: [phone]  Dept Fax: [phone]      VISITDATE: 10/2/2023   REFERRING PROVIDER: No ref. provider found      Donny Underwood is a 26 y.o. female  who presents today in the office for:    Other (3 month f/u- eczema )      HISTORY OF PRESENT ILLNESS:  As above. At last visit, 6/12/23, patient did not show improvement on previous regiment. Started on clobetasol solution, elidel, and ketoconazole shampoo. She was advised to follow with rheumatology for refills of cellcept. Also started on elidel for eczema of arms and legs. Patient states her face and scalp is doing better on the topicals. She uses an exfoliating brush gently every couple days when she notices flaking. States axillae is itchy, had flares during the summer and was not able to use deodorant (jasvir deodorant). She stopped shaving her axillae under her arms 1 year ago. Notes she is using sunscreen. She has not seen rheumatology for her sjogrens as they were not accepting new patients, working with PCP for establishing new doctors. Does have refills on cellcept.     MEDICAL PROBLEMS:  Patient Active Problem List    Diagnosis Date Noted    PTSD (post-traumatic stress disorder) 11/16/2022     Priority: Medium    SLE (systemic lupus erythematosus related syndrome) ([address]) 11/16/2022     Priority: Medium    Vitamin D deficiency 11/16/2022     Priority: Medium    Chronic midline low back pain without sciatica 11/16/2022     Priority: Medium    Fibromyalgia 11/16/2022     Priority: Medium    Dural venous sinus thrombosis 12/11/2020     Priority: Medium    Anemia of chronic disease 12/09/2020     Priority: Medium    Sjogren's syndrome with lung involvement ([address]) 12/09/2020     Priority: Medium    Seizures ([address]) 12/09/2020     Priority: Medium    Lesion of left frontal lobe of brain

## 2023-10-02 NOTE — PATIENT INSTRUCTIONS
Seborrheic dermatitis of scalp and face  - continue clobetasol solution daily to scalp (instead of cream)  - continue pimecrolimus cream (ELIDEL) 1% to face twice daily (avoid eyelids), can use on armpits too  - continue  ketoconazole (NIZORAL) 2% shampoo weekly  - follow with rheumatology  - counseled patient that I can refill cellcept for 2 months while she is waiting on rheumatology.  Labs were normal last visit  - Recommend vanicream deodorant for sensitive skin

## 2024-10-02 ENCOUNTER — OFFICE VISIT (OUTPATIENT)
Dept: DERMATOLOGY | Age: 34
End: 2024-10-02
Payer: COMMERCIAL

## 2024-10-02 VITALS
DIASTOLIC BLOOD PRESSURE: 86 MMHG | SYSTOLIC BLOOD PRESSURE: 131 MMHG | HEIGHT: 64 IN | BODY MASS INDEX: 47.63 KG/M2 | HEART RATE: 80 BPM | TEMPERATURE: 98.4 F | WEIGHT: 279 LBS

## 2024-10-02 DIAGNOSIS — L21.9 SEBORRHEIC DERMATITIS: Primary | ICD-10-CM

## 2024-10-02 DIAGNOSIS — L30.8 OTHER ECZEMA: ICD-10-CM

## 2024-10-02 PROCEDURE — G8484 FLU IMMUNIZE NO ADMIN: HCPCS | Performed by: DERMATOLOGY

## 2024-10-02 PROCEDURE — 1036F TOBACCO NON-USER: CPT | Performed by: DERMATOLOGY

## 2024-10-02 PROCEDURE — G8427 DOCREV CUR MEDS BY ELIG CLIN: HCPCS | Performed by: DERMATOLOGY

## 2024-10-02 PROCEDURE — G8417 CALC BMI ABV UP PARAM F/U: HCPCS | Performed by: DERMATOLOGY

## 2024-10-02 PROCEDURE — 99214 OFFICE O/P EST MOD 30 MIN: CPT | Performed by: DERMATOLOGY

## 2024-10-02 RX ORDER — CLOBETASOL PROPIONATE 0.5 MG/ML
SOLUTION TOPICAL
Qty: 50 ML | Refills: 5 | Status: SHIPPED | OUTPATIENT
Start: 2024-10-02

## 2024-10-02 RX ORDER — ROFLUMILAST 3 MG/G
AEROSOL, FOAM TOPICAL
Qty: 60 G | Refills: 5 | Status: SHIPPED | OUTPATIENT
Start: 2024-10-02

## 2024-10-02 RX ORDER — KETOCONAZOLE 20 MG/ML
SHAMPOO TOPICAL
Qty: 120 ML | Refills: 2 | Status: SHIPPED | OUTPATIENT
Start: 2024-10-02

## 2024-10-02 RX ORDER — PIMECROLIMUS 10 MG/G
CREAM TOPICAL
Qty: 30 G | Refills: 3 | Status: SHIPPED | OUTPATIENT
Start: 2024-10-02

## 2024-10-02 NOTE — PATIENT INSTRUCTIONS
- continue clobetasol solution daily to scalp (instead of cream)  - continue pimecrolimus cream (ELIDEL) 1% to face twice daily (avoid eyelids), can use on armpits too  - continue  ketoconazole (NIZORAL) 2% shampoo weekly  - start Zoryve foam on flares; cream samples provided

## 2024-10-02 NOTE — PROGRESS NOTES
Dermatology Patient Note  [facility], [facility] DERMATOLOGY  [address] [facility]  [address]  [facility] [address]  Dept: [phone]  Dept Fax: [phone]      VISITDATE: 10/2/2024   REFERRING PROVIDER: No ref. provider found      Stephanie Dave is a 34 y.o. female  who presents today in the office for:    Other (Patient presents today for a one year follow up of seb derm and eczema. Treating with Elidel, ketoconazole shampoo and clobetasol solution. She states her skin is worse in the winter. She is noticing flaring starting on her forehead in between her eyebrows and side of her cheeks.)      HISTORY OF PRESENT ILLNESS:  Patient presents for 1 year follow up for eczema and seborrheic dermatitis. At  on 10/2/23, she was to continue clobetasol solution daily on the scalp, pimecrolimus cream BID avoiding eyelids, and ketoconazole 2% shampoo weekly. She was to continue to follow with rheum and vanicream deodorant was recommended. For eczema, she was to use pimecrolimus as needed. Both conditions were stable at that time and she was to follow up in 1 year.     She reports that she is currently using Elidel, ketoconazole shampoo, and clobetasol solution for seb derm. She notes that her skin worsens in the winter. She is currently focally flaring on the forehead between the brows and on the sides of her cheeks.     MEDICAL PROBLEMS:  Patient Active Problem List    Diagnosis Date Noted    PTSD (post-traumatic stress disorder) 11/16/2022     Priority: Medium    SLE (systemic lupus erythematosus related syndrome) ([facility]) 11/16/2022     Priority: Medium    Vitamin D deficiency 11/16/2022     Priority: Medium    Chronic midline low back pain without sciatica 11/16/2022     Priority: Medium    Fibromyalgia 11/16/2022     Priority: Medium    Dural venous sinus thrombosis 12/11/2020     Priority: Medium    Anemia of chronic disease 12/09/2020     Priority: Medium

## 2025-01-30 ENCOUNTER — HOSPITAL ENCOUNTER (EMERGENCY)
Age: 35
Discharge: HOME | End: 2025-01-30
Attending: EMERGENCY MEDICINE
Payer: COMMERCIAL

## 2025-01-30 VITALS
BODY MASS INDEX: 48.06 KG/M2 | DIASTOLIC BLOOD PRESSURE: 83 MMHG | SYSTOLIC BLOOD PRESSURE: 113 MMHG | WEIGHT: 280 LBS | RESPIRATION RATE: 16 BRPM | HEART RATE: 117 BPM | OXYGEN SATURATION: 96 % | TEMPERATURE: 97 F

## 2025-01-30 DIAGNOSIS — R11.0 NAUSEA: Primary | ICD-10-CM

## 2025-01-30 LAB
ALBUMIN SERPL-MCNC: 3.1 G/DL (ref 3.5–5.2)
ALBUMIN/GLOB SERPL: 0.6 {RATIO} (ref 1–2.5)
ALP SERPL-CCNC: 80 U/L (ref 35–104)
ALT SERPL-CCNC: 28 U/L (ref 10–35)
ANION GAP SERPL CALCULATED.3IONS-SCNC: 16 MMOL/L (ref 9–16)
AST SERPL-CCNC: 65 U/L (ref 10–35)
ATYPICAL LYMPHOCYTE ABSOLUTE COUNT: 0.19 K/UL
ATYPICAL LYMPHOCYTES: 3 %
BASOPHILS # BLD: 0 K/UL (ref 0–0.2)
BASOPHILS NFR BLD: 0 %
BILIRUB SERPL-MCNC: 0.4 MG/DL (ref 0–1.2)
BUN SERPL-MCNC: 15 MG/DL (ref 6–20)
CALCIUM SERPL-MCNC: 8.4 MG/DL (ref 8.6–10.4)
CHLORIDE SERPL-SCNC: 95 MMOL/L (ref 98–107)
CO2 SERPL-SCNC: 21 MMOL/L (ref 20–31)
CREAT SERPL-MCNC: 0.9 MG/DL (ref 0.5–0.9)
EOSINOPHIL # BLD: 0.06 K/UL (ref 0–0.4)
EOSINOPHILS RELATIVE PERCENT: 1 % (ref 1–4)
ERYTHROCYTE [DISTWIDTH] IN BLOOD BY AUTOMATED COUNT: 15.2 % (ref 11.8–14.4)
GFR, ESTIMATED: 83 ML/MIN/1.73M2
GLUCOSE SERPL-MCNC: 131 MG/DL (ref 74–99)
HCT VFR BLD AUTO: 45.8 % (ref 36.3–47.1)
HGB BLD-MCNC: 14.8 G/DL (ref 11.9–15.1)
IMM GRANULOCYTES # BLD AUTO: 0 K/UL (ref 0–0.3)
IMM GRANULOCYTES NFR BLD: 0 %
LYMPHOCYTES NFR BLD: 1.66 K/UL (ref 1–4.8)
LYMPHOCYTES RELATIVE PERCENT: 26 % (ref 24–44)
MCH RBC QN AUTO: 27 PG (ref 25.2–33.5)
MCHC RBC AUTO-ENTMCNC: 32.3 G/DL (ref 28.4–34.8)
MCV RBC AUTO: 83.4 FL (ref 82.6–102.9)
MONOCYTES NFR BLD: 0.32 K/UL (ref 0.2–0.8)
MONOCYTES NFR BLD: 5 % (ref 1–7)
NEUTROPHILS NFR BLD: 65 % (ref 36–66)
NEUTS SEG NFR BLD: 4.17 K/UL (ref 1.8–7.7)
NRBC BLD-RTO: 0 PER 100 WBC
PLATELET # BLD AUTO: 175 K/UL (ref 138–453)
PMV BLD AUTO: 12.6 FL (ref 8.1–13.5)
POTASSIUM SERPL-SCNC: 3.9 MMOL/L (ref 3.7–5.3)
PROT SERPL-MCNC: 8.1 G/DL (ref 6.6–8.7)
RBC # BLD AUTO: 5.49 M/UL (ref 3.95–5.11)
SODIUM SERPL-SCNC: 132 MMOL/L (ref 136–145)
WBC OTHER # BLD: 6.4 K/UL (ref 3.5–11.3)

## 2025-01-30 PROCEDURE — 96375 TX/PRO/DX INJ NEW DRUG ADDON: CPT

## 2025-01-30 PROCEDURE — 6360000002 HC RX W HCPCS: Performed by: EMERGENCY MEDICINE

## 2025-01-30 PROCEDURE — 96374 THER/PROPH/DIAG INJ IV PUSH: CPT

## 2025-01-30 PROCEDURE — 80053 COMPREHEN METABOLIC PANEL: CPT

## 2025-01-30 PROCEDURE — 99284 EMERGENCY DEPT VISIT MOD MDM: CPT

## 2025-01-30 PROCEDURE — 85025 COMPLETE CBC W/AUTO DIFF WBC: CPT

## 2025-01-30 PROCEDURE — 2580000003 HC RX 258: Performed by: EMERGENCY MEDICINE

## 2025-01-30 RX ORDER — PREDNISONE 20 MG/1
TABLET ORAL
Qty: 11 TABLET | Refills: 0 | Status: SHIPPED | OUTPATIENT
Start: 2025-01-30 | End: 2025-02-08

## 2025-01-30 RX ORDER — 0.9 % SODIUM CHLORIDE 0.9 %
1000 INTRAVENOUS SOLUTION INTRAVENOUS ONCE
Status: COMPLETED | OUTPATIENT
Start: 2025-01-30 | End: 2025-01-30

## 2025-01-30 RX ORDER — ONDANSETRON 2 MG/ML
4 INJECTION INTRAMUSCULAR; INTRAVENOUS ONCE
Status: COMPLETED | OUTPATIENT
Start: 2025-01-30 | End: 2025-01-30

## 2025-01-30 RX ORDER — ORPHENADRINE CITRATE 30 MG/ML
60 INJECTION INTRAMUSCULAR; INTRAVENOUS ONCE
Status: COMPLETED | OUTPATIENT
Start: 2025-01-30 | End: 2025-01-30

## 2025-01-30 RX ORDER — ONDANSETRON 4 MG/1
4 TABLET, ORALLY DISINTEGRATING ORAL 3 TIMES DAILY PRN
Qty: 21 TABLET | Refills: 0 | Status: SHIPPED | OUTPATIENT
Start: 2025-01-30

## 2025-01-30 RX ADMIN — SODIUM CHLORIDE 1000 ML: 9 INJECTION, SOLUTION INTRAVENOUS at 15:39

## 2025-01-30 RX ADMIN — ORPHENADRINE CITRATE 60 MG: 60 INJECTION INTRAMUSCULAR; INTRAVENOUS at 15:35

## 2025-01-30 RX ADMIN — ONDANSETRON 4 MG: 2 INJECTION, SOLUTION INTRAMUSCULAR; INTRAVENOUS at 15:35

## 2025-01-30 ASSESSMENT — PAIN - FUNCTIONAL ASSESSMENT: PAIN_FUNCTIONAL_ASSESSMENT: 0-10

## 2025-01-30 ASSESSMENT — LIFESTYLE VARIABLES
HOW MANY STANDARD DRINKS CONTAINING ALCOHOL DO YOU HAVE ON A TYPICAL DAY: PATIENT DOES NOT DRINK
HOW OFTEN DO YOU HAVE A DRINK CONTAINING ALCOHOL: NEVER

## 2025-01-30 ASSESSMENT — PAIN DESCRIPTION - FREQUENCY: FREQUENCY: CONTINUOUS

## 2025-01-30 ASSESSMENT — PAIN DESCRIPTION - LOCATION: LOCATION: GENERALIZED

## 2025-01-30 ASSESSMENT — PAIN DESCRIPTION - DESCRIPTORS: DESCRIPTORS: SHARP

## 2025-01-30 ASSESSMENT — PAIN SCALES - GENERAL: PAINLEVEL_OUTOF10: 10

## 2025-01-30 ASSESSMENT — ENCOUNTER SYMPTOMS: NAUSEA: 1

## 2025-01-30 NOTE — DISCHARGE INSTRUCTIONS
As we discussed I recommend light bland diet over the next couple of days.  Make sure you are getting plenty of fluids.  Please follow with your primary care provider if you have concerns.  Zofran can be used as needed for nausea and or vomiting.

## 2025-01-30 NOTE — ED PROVIDER NOTES
EMERGENCY DEPARTMENT ENCOUNTER    Pt Name: Stephanie Dave  MRN: [id number]  Birthdate 1990  Date of evaluation: 1/30/25  CHIEF COMPLAINT       Chief Complaint   Patient presents with    Lupus     States having a flare up, only symptom is Nausea.     HISTORY OF PRESENT ILLNESS   34-year-old female presents emergency room for generalized malaise poor appetite and nausea.  Patient has history of lupus as well as fibromyalgia.  She reports just feeling very rundown over the last couple of days.  This occurs with her lupus flareups.             REVIEW OF SYSTEMS     Review of Systems   Gastrointestinal:  Positive for nausea.   Musculoskeletal:  Positive for arthralgias and myalgias.     PASTMEDICAL HISTORY     Past Medical History:   Diagnosis Date    Cerebral artery occlusion with cerebral infarction ([facility]) 2020    Fibromyalgia     History of Sjogren disease ([facility])     Hyperthyroidism     Lupus (systemic lupus erythematosus) ([facility])      Past Problem List  Patient Active Problem List   Diagnosis Code    Anemia of chronic disease D63.8    Sjogren's syndrome with lung involvement ([facility]) M35.02    Seizures ([facility]) R56.9    Lesion of left frontal lobe of brain G93.9    PTSD (post-traumatic stress disorder) F43.10    Dural venous sinus thrombosis G08    SLE (systemic lupus erythematosus related syndrome) ([facility]) M32.9    Vitamin D deficiency E55.9    Chronic midline low back pain without sciatica M54.50, G89.29    Fibromyalgia M79.7     SURGICAL HISTORY     History reviewed. No pertinent surgical history.  CURRENT MEDICATIONS       Discharge Medication List as of 1/30/2025  5:25 PM        CONTINUE these medications which have NOT CHANGED    Details   clobetasol (TEMOVATE) 0.05 % external solution Apply to scalp daily, Disp-50 mL, R-5, Normal      ketoconazole (NIZORAL) 2 % shampoo Apply topically daily as needed., Disp-120 mL, R-2, Normal      pimecrolimus (ELIDEL) 1 % cream Apply topically 2 times daily., Disp-30 g, R-3, Normal

## 2025-03-24 SDOH — ECONOMIC STABILITY: FOOD INSECURITY: WITHIN THE PAST 12 MONTHS, THE FOOD YOU BOUGHT JUST DIDN'T LAST AND YOU DIDN'T HAVE MONEY TO GET MORE.: NEVER TRUE

## 2025-03-24 SDOH — ECONOMIC STABILITY: FOOD INSECURITY: WITHIN THE PAST 12 MONTHS, YOU WORRIED THAT YOUR FOOD WOULD RUN OUT BEFORE YOU GOT MONEY TO BUY MORE.: NEVER TRUE

## 2025-03-24 SDOH — ECONOMIC STABILITY: TRANSPORTATION INSECURITY
IN THE PAST 12 MONTHS, HAS THE LACK OF TRANSPORTATION KEPT YOU FROM MEDICAL APPOINTMENTS OR FROM GETTING MEDICATIONS?: NO

## 2025-03-24 SDOH — ECONOMIC STABILITY: INCOME INSECURITY: IN THE LAST 12 MONTHS, WAS THERE A TIME WHEN YOU WERE NOT ABLE TO PAY THE MORTGAGE OR RENT ON TIME?: NO

## 2025-03-24 SDOH — ECONOMIC STABILITY: TRANSPORTATION INSECURITY
IN THE PAST 12 MONTHS, HAS LACK OF TRANSPORTATION KEPT YOU FROM MEETINGS, WORK, OR FROM GETTING THINGS NEEDED FOR DAILY LIVING?: NO

## 2025-03-24 ASSESSMENT — PATIENT HEALTH QUESTIONNAIRE - PHQ9
SUM OF ALL RESPONSES TO PHQ9 QUESTIONS 1 & 2: 0
1. LITTLE INTEREST OR PLEASURE IN DOING THINGS: NOT AT ALL
SUM OF ALL RESPONSES TO PHQ QUESTIONS 1-9: 0
2. FEELING DOWN, DEPRESSED OR HOPELESS: NOT AT ALL
SUM OF ALL RESPONSES TO PHQ QUESTIONS 1-9: 0
2. FEELING DOWN, DEPRESSED OR HOPELESS: NOT AT ALL
1. LITTLE INTEREST OR PLEASURE IN DOING THINGS: NOT AT ALL

## 2025-03-26 NOTE — PROGRESS NOTES
Rheumatology, [place]  3. SLE (systemic lupus erythematosus related syndrome) ([facility])  Comments:  New referral to Rheumatology. Denies significant symptoms today.  Orders:  -     tiZANidine (ZANAFLEX) 2 MG tablet; Take 1-2 tablets every 12 hours as needed for muscle spasms, Disp-60 tablet, R-0Normal  -     CBC; Future  -     Comprehensive Metabolic Panel, Fasting; Future  -     Lipid Panel; Future  -     Hemoglobin A1C; Future  -     ARIANNE - Kiran Vásquez MD, Rheumatology, [place]  4. Seizures ([facility])  Comments:  Last seizure 2020. Neurology Dr. Naveen Recinos 3/2022.  Orders:  -     CBC; Future  -     Comprehensive Metabolic Panel, Fasting; Future  -     Lipid Panel; Future  -     Hemoglobin A1C; Future  -     T4, Free; Future  -     TSH; Future  5. Fibromyalgia  -     tiZANidine (ZANAFLEX) 2 MG tablet; Take 1-2 tablets every 12 hours as needed for muscle spasms, Disp-60 tablet, R-0Normal  -     CBC; Future  -     Comprehensive Metabolic Panel, Fasting; Future  -     Lipid Panel; Future  -     Hemoglobin A1C; Future  -     ARIANNE - Kiran Vásquez MD, Rheumatology, [place]  6. Screening for cardiovascular condition  -     CBC; Future  -     Comprehensive Metabolic Panel, Fasting; Future  -     Lipid Panel; Future  -     Hemoglobin A1C; Future  7. Seasonal allergies  -     cetirizine (ZYRTEC) 10 MG tablet; Take 1 tablet by mouth daily as needed for Allergies, Disp-30 tablet, R-5Normal  8. Vitamin D deficiency  -     Vitamin D 25 Hydroxy; Future  9. Daytime sleepiness  -     Vitamin D 25 Hydroxy; Future  -     Home Sleep Study; Future  10. Fatigue, unspecified type  -     Vitamin D 25 Hydroxy; Future  -     Home Sleep Study; Future  11. Severe obesity (BMI >= 40)  -     Home Sleep Study; Future  12. Immunity status testing  -     Varicella Zoster Antibody, IgG; Future  13. Family history of sleep apnea  -     Home Sleep Study; Future         New lab orders today.  Discussed scheduling sleep study. New rheumatology

## 2025-03-27 ENCOUNTER — HOSPITAL ENCOUNTER (OUTPATIENT)
Age: 35
Setting detail: SPECIMEN
Discharge: HOME | End: 2025-03-27

## 2025-03-27 ENCOUNTER — OFFICE VISIT (OUTPATIENT)
Dept: FAMILY MEDICINE CLINIC | Age: 35
End: 2025-03-27

## 2025-03-27 ENCOUNTER — RESULTS FOLLOW-UP (OUTPATIENT)
Dept: FAMILY MEDICINE CLINIC | Age: 35
End: 2025-03-27

## 2025-03-27 VITALS
BODY MASS INDEX: 47.29 KG/M2 | HEART RATE: 81 BPM | HEIGHT: 64 IN | SYSTOLIC BLOOD PRESSURE: 141 MMHG | DIASTOLIC BLOOD PRESSURE: 100 MMHG | WEIGHT: 277 LBS

## 2025-03-27 DIAGNOSIS — Z13.6 SCREENING FOR CARDIOVASCULAR CONDITION: ICD-10-CM

## 2025-03-27 DIAGNOSIS — R56.9 SEIZURES (HCC): ICD-10-CM

## 2025-03-27 DIAGNOSIS — E55.9 VITAMIN D DEFICIENCY: ICD-10-CM

## 2025-03-27 DIAGNOSIS — M32.9 SLE (SYSTEMIC LUPUS ERYTHEMATOSUS RELATED SYNDROME) (HCC): ICD-10-CM

## 2025-03-27 DIAGNOSIS — R53.83 FATIGUE, UNSPECIFIED TYPE: ICD-10-CM

## 2025-03-27 DIAGNOSIS — Z01.84 IMMUNITY STATUS TESTING: ICD-10-CM

## 2025-03-27 DIAGNOSIS — M35.02 SJOGREN'S SYNDROME WITH LUNG INVOLVEMENT (HCC): ICD-10-CM

## 2025-03-27 DIAGNOSIS — R40.0 DAYTIME SLEEPINESS: ICD-10-CM

## 2025-03-27 DIAGNOSIS — E66.01 SEVERE OBESITY (BMI >= 40): ICD-10-CM

## 2025-03-27 DIAGNOSIS — M79.7 FIBROMYALGIA: ICD-10-CM

## 2025-03-27 DIAGNOSIS — Z00.00 WELL ADULT EXAM: Primary | ICD-10-CM

## 2025-03-27 DIAGNOSIS — J30.2 SEASONAL ALLERGIES: ICD-10-CM

## 2025-03-27 DIAGNOSIS — Z82.0 FAMILY HISTORY OF SLEEP APNEA: ICD-10-CM

## 2025-03-27 LAB
25(OH)D3 SERPL-MCNC: 20.3 NG/ML (ref 30–100)
ALBUMIN SERPL-MCNC: 4.1 G/DL (ref 3.5–5.2)
ALBUMIN/GLOB SERPL: 0.8 {RATIO} (ref 1–2.5)
ALP SERPL-CCNC: 79 U/L (ref 35–104)
ALT SERPL-CCNC: 14 U/L (ref 10–35)
ANION GAP SERPL CALCULATED.3IONS-SCNC: 10 MMOL/L (ref 9–16)
AST SERPL-CCNC: 21 U/L (ref 10–35)
BILIRUB SERPL-MCNC: 0.3 MG/DL (ref 0–1.2)
BUN SERPL-MCNC: 9 MG/DL (ref 6–20)
CALCIUM SERPL-MCNC: 8.8 MG/DL (ref 8.6–10.4)
CHLORIDE SERPL-SCNC: 103 MMOL/L (ref 98–107)
CHOLEST SERPL-MCNC: 193 MG/DL (ref 0–199)
CHOLESTEROL/HDL RATIO: 4.2
CO2 SERPL-SCNC: 23 MMOL/L (ref 20–31)
CREAT SERPL-MCNC: 0.6 MG/DL (ref 0.6–0.9)
ERYTHROCYTE [DISTWIDTH] IN BLOOD BY AUTOMATED COUNT: 15.4 % (ref 11.8–14.4)
EST. AVERAGE GLUCOSE BLD GHB EST-MCNC: 117 MG/DL
GFR, ESTIMATED: >90 ML/MIN/1.73M2
GLUCOSE P FAST SERPL-MCNC: 97 MG/DL (ref 74–99)
HBA1C MFR BLD: 5.7 % (ref 4–6)
HCT VFR BLD AUTO: 37.5 % (ref 36.3–47.1)
HDLC SERPL-MCNC: 46 MG/DL
HGB BLD-MCNC: 11.4 G/DL (ref 11.9–15.1)
LDLC SERPL CALC-MCNC: 132 MG/DL (ref 0–100)
MCH RBC QN AUTO: 25.7 PG (ref 25.2–33.5)
MCHC RBC AUTO-ENTMCNC: 30.4 G/DL (ref 28.4–34.8)
MCV RBC AUTO: 84.7 FL (ref 82.6–102.9)
NRBC BLD-RTO: 0 PER 100 WBC
PLATELET # BLD AUTO: 208 K/UL (ref 138–453)
PMV BLD AUTO: 12.2 FL (ref 8.1–13.5)
POTASSIUM SERPL-SCNC: 4.2 MMOL/L (ref 3.7–5.3)
PROT SERPL-MCNC: 9.2 G/DL (ref 6.6–8.7)
RBC # BLD AUTO: 4.43 M/UL (ref 3.95–5.11)
SODIUM SERPL-SCNC: 136 MMOL/L (ref 136–145)
T4 FREE SERPL-MCNC: 0.9 NG/DL (ref 0.9–1.7)
TRIGL SERPL-MCNC: 77 MG/DL
TSH SERPL DL<=0.05 MIU/L-ACNC: 2.45 UIU/ML (ref 0.27–4.2)
VLDLC SERPL CALC-MCNC: 15 MG/DL (ref 1–30)
WBC OTHER # BLD: 5 K/UL (ref 3.5–11.3)

## 2025-03-27 RX ORDER — TIZANIDINE 2 MG/1
TABLET ORAL
Qty: 60 TABLET | Refills: 0 | Status: SHIPPED | OUTPATIENT
Start: 2025-03-27

## 2025-03-27 RX ORDER — CETIRIZINE HYDROCHLORIDE 10 MG/1
10 TABLET ORAL DAILY PRN
Qty: 30 TABLET | Refills: 5 | Status: SHIPPED | OUTPATIENT
Start: 2025-03-27

## 2025-03-27 ASSESSMENT — ENCOUNTER SYMPTOMS
NAUSEA: 1
DIARRHEA: 0
SINUS PAIN: 0
CHEST TIGHTNESS: 0
ABDOMINAL PAIN: 0
SINUS PRESSURE: 0
COLOR CHANGE: 0
SHORTNESS OF BREATH: 0
CONSTIPATION: 0
RHINORRHEA: 1

## 2025-03-28 LAB — VZV IGG SER QL IA: 3.08

## 2025-04-17 ENCOUNTER — HOSPITAL ENCOUNTER (OUTPATIENT)
Dept: SLEEP CENTER | Age: 35
Discharge: HOME | End: 2025-04-19
Payer: COMMERCIAL

## 2025-04-17 DIAGNOSIS — R40.0 DAYTIME SLEEPINESS: ICD-10-CM

## 2025-04-17 DIAGNOSIS — E66.01 SEVERE OBESITY (BMI >= 40): ICD-10-CM

## 2025-04-17 DIAGNOSIS — Z82.0 FAMILY HISTORY OF SLEEP APNEA: ICD-10-CM

## 2025-04-17 DIAGNOSIS — R53.83 FATIGUE, UNSPECIFIED TYPE: ICD-10-CM

## 2025-04-17 PROCEDURE — G0399 HOME SLEEP TEST/TYPE 3 PORTA: HCPCS

## 2025-04-21 NOTE — PROGRESS NOTES
Dermatology Patient Note  [facility]  [facility] DERMATOLOGY  [address] [place] [address]  Dept: [phone]  Dept Fax: [phone]      VISITDATE: 4/22/2025   REFERRING PROVIDER: No ref. provider found      Stephanie Dave is a 35 y.o. female  who presents today in the office for:    Other (Patient presents today for seb derm and eczema.  Patient states both conditions are doing well.  Patient requesting refills today.  Patient offers no new concerns today.  )      HISTORY OF PRESENT ILLNESS:  Patient presents for 6 month eczema and seb derm follow up. At , she was continued on clobetasol solution, pimecrolimus cream, ketoconazole shampoo, and Zoryve foam.     Today, patient reports that she is doing well with use of all topicals. She states that the Zoryve foam sample worked very well. She denies any eczematous flares since last visit.     MEDICAL PROBLEMS:  Patient Active Problem List    Diagnosis Date Noted    PTSD (post-traumatic stress disorder) 11/16/2022     Priority: Medium    SLE (systemic lupus erythematosus related syndrome) ([facility]) 11/16/2022     Priority: Medium    Vitamin D deficiency 11/16/2022     Priority: Medium    Chronic midline low back pain without sciatica 11/16/2022     Priority: Medium    Fibromyalgia 11/16/2022     Priority: Medium    Dural venous sinus thrombosis 12/11/2020     Priority: Medium    Anemia of chronic disease 12/09/2020     Priority: Medium    Sjogren's syndrome with lung involvement ([facility]) 12/09/2020     Priority: Medium    Seizures ([facility]) 12/09/2020     Priority: Medium    Lesion of left frontal lobe of brain 12/06/2020     Priority: Medium    Severe obesity (BMI >= 40) 03/27/2025       CURRENT MEDICATIONS:   Current Outpatient Medications   Medication Sig Dispense Refill    Roflumilast (ZORYVE) 0.3 % CREA Apply to affected area daily 60 g 2    clobetasol (TEMOVATE) 0.05 % external solution Apply to scalp daily 50 mL 5

## 2025-04-22 ENCOUNTER — OFFICE VISIT (OUTPATIENT)
Age: 35
End: 2025-04-22
Payer: COMMERCIAL

## 2025-04-22 VITALS
HEART RATE: 87 BPM | OXYGEN SATURATION: 91 % | DIASTOLIC BLOOD PRESSURE: 81 MMHG | HEIGHT: 64 IN | BODY MASS INDEX: 46.44 KG/M2 | SYSTOLIC BLOOD PRESSURE: 121 MMHG | WEIGHT: 272 LBS | TEMPERATURE: 98.8 F

## 2025-04-22 DIAGNOSIS — L40.9 PSORIASIS: ICD-10-CM

## 2025-04-22 DIAGNOSIS — L40.8 SEBOPSORIASIS: Primary | ICD-10-CM

## 2025-04-22 DIAGNOSIS — L21.9 SEBORRHEIC DERMATITIS: ICD-10-CM

## 2025-04-22 PROCEDURE — G8428 CUR MEDS NOT DOCUMENT: HCPCS | Performed by: DERMATOLOGY

## 2025-04-22 PROCEDURE — G8417 CALC BMI ABV UP PARAM F/U: HCPCS | Performed by: DERMATOLOGY

## 2025-04-22 PROCEDURE — 1036F TOBACCO NON-USER: CPT | Performed by: DERMATOLOGY

## 2025-04-22 PROCEDURE — 99212 OFFICE O/P EST SF 10 MIN: CPT | Performed by: DERMATOLOGY

## 2025-04-22 PROCEDURE — 99213 OFFICE O/P EST LOW 20 MIN: CPT | Performed by: DERMATOLOGY

## 2025-04-22 RX ORDER — ROFLUMILAST 3 MG/G
CREAM TOPICAL
Qty: 60 G | Refills: 2 | Status: SHIPPED | OUTPATIENT
Start: 2025-04-22

## 2025-04-22 RX ORDER — KETOCONAZOLE 20 MG/ML
SHAMPOO, SUSPENSION TOPICAL
Qty: 120 ML | Refills: 2 | Status: SHIPPED | OUTPATIENT
Start: 2025-04-22

## 2025-04-22 RX ORDER — CLOBETASOL PROPIONATE 0.5 MG/ML
SOLUTION TOPICAL
Qty: 50 ML | Refills: 5 | Status: SHIPPED | OUTPATIENT
Start: 2025-04-22

## 2025-04-22 NOTE — PATIENT INSTRUCTIONS
- continue clobetasol solution to scalp daily   - continue ketoconazole 2% shampoo weekly, leaving on for 5 min prior to rinsing  - continue pimecrolimus cream to face and axillae twice daily  - start PA for Zoryve cream for flares

## 2025-04-25 ENCOUNTER — RESULTS FOLLOW-UP (OUTPATIENT)
Dept: FAMILY MEDICINE CLINIC | Age: 35
End: 2025-04-25

## 2025-04-25 DIAGNOSIS — G47.33 OSA (OBSTRUCTIVE SLEEP APNEA): Primary | ICD-10-CM

## 2025-05-06 ENCOUNTER — TELEPHONE (OUTPATIENT)
Dept: FAMILY MEDICINE CLINIC | Age: 35
End: 2025-05-06

## 2025-05-06 NOTE — TELEPHONE ENCOUNTER
Daniela from [employer] called about the order for CPAP stating that insurance wont cover CPAP due to daytime sleepiness stating it has to be excessive. If chart notes are addendum and a new order is ordered they will cover the CPAP. Please advise.         DANIELA ( [phone])

## 2025-06-03 DIAGNOSIS — M32.9 SLE (SYSTEMIC LUPUS ERYTHEMATOSUS RELATED SYNDROME) (HCC): ICD-10-CM

## 2025-06-03 DIAGNOSIS — M79.7 FIBROMYALGIA: ICD-10-CM

## 2025-06-04 RX ORDER — TIZANIDINE 2 MG/1
TABLET ORAL
Qty: 60 TABLET | Refills: 0 | Status: SHIPPED | OUTPATIENT
Start: 2025-06-04

## 2025-06-04 NOTE — TELEPHONE ENCOUNTER
Stephanie Dave is calling to request a refill on the following medication(s):    Last Visit Date (If Applicable):  3/27/2025    Next Visit Date:    Visit date not found    Medication Request:  Requested Prescriptions     Pending Prescriptions Disp Refills    tiZANidine (ZANAFLEX) 2 MG tablet [Pharmacy Med Name: TIZANIDINE HCL 2 MG TABLET] 60 tablet 0     Sig: TAKE 1-2 TABLETS EVERY 12 HOURS AS NEEDED FOR MUSCLE SPASMS

## 2025-07-27 DIAGNOSIS — M79.7 FIBROMYALGIA: ICD-10-CM

## 2025-07-27 DIAGNOSIS — M32.9 SLE (SYSTEMIC LUPUS ERYTHEMATOSUS RELATED SYNDROME) (HCC): ICD-10-CM

## 2025-07-29 RX ORDER — TIZANIDINE 2 MG/1
TABLET ORAL
Qty: 60 TABLET | Refills: 0 | Status: SHIPPED | OUTPATIENT
Start: 2025-07-29

## 2025-08-19 ENCOUNTER — APPOINTMENT (OUTPATIENT)
Dept: GENERAL RADIOLOGY | Age: 35
End: 2025-08-19
Payer: COMMERCIAL

## 2025-08-19 ENCOUNTER — HOSPITAL ENCOUNTER (EMERGENCY)
Age: 35
Discharge: HOME | End: 2025-08-20
Attending: STUDENT IN AN ORGANIZED HEALTH CARE EDUCATION/TRAINING PROGRAM
Payer: COMMERCIAL

## 2025-08-19 VITALS
DIASTOLIC BLOOD PRESSURE: 106 MMHG | HEART RATE: 89 BPM | WEIGHT: 270 LBS | TEMPERATURE: 97.7 F | SYSTOLIC BLOOD PRESSURE: 165 MMHG | HEIGHT: 64 IN | OXYGEN SATURATION: 100 % | BODY MASS INDEX: 46.1 KG/M2 | RESPIRATION RATE: 16 BRPM

## 2025-08-19 DIAGNOSIS — M43.6 TORTICOLLIS: Primary | ICD-10-CM

## 2025-08-19 PROCEDURE — 96372 THER/PROPH/DIAG INJ SC/IM: CPT

## 2025-08-19 PROCEDURE — 6370000000 HC RX 637 (ALT 250 FOR IP): Performed by: PHYSICIAN ASSISTANT

## 2025-08-19 PROCEDURE — 99284 EMERGENCY DEPT VISIT MOD MDM: CPT

## 2025-08-19 PROCEDURE — 72040 X-RAY EXAM NECK SPINE 2-3 VW: CPT

## 2025-08-19 PROCEDURE — 6360000002 HC RX W HCPCS: Performed by: PHYSICIAN ASSISTANT

## 2025-08-19 RX ORDER — HYDROCODONE BITARTRATE AND ACETAMINOPHEN 5; 325 MG/1; MG/1
1-2 TABLET ORAL EVERY 8 HOURS PRN
Qty: 10 TABLET | Refills: 0 | Status: SHIPPED | OUTPATIENT
Start: 2025-08-19 | End: 2025-08-24

## 2025-08-19 RX ORDER — METHOCARBAMOL 750 MG/1
750 TABLET, FILM COATED ORAL 4 TIMES DAILY
Qty: 40 TABLET | Refills: 0 | Status: SHIPPED | OUTPATIENT
Start: 2025-08-19 | End: 2025-08-29

## 2025-08-19 RX ORDER — METAXALONE 800 MG/1
800 TABLET ORAL ONCE
Status: COMPLETED | OUTPATIENT
Start: 2025-08-19 | End: 2025-08-19

## 2025-08-19 RX ORDER — HYDROCODONE BITARTRATE AND ACETAMINOPHEN 5; 325 MG/1; MG/1
1 TABLET ORAL ONCE
Status: COMPLETED | OUTPATIENT
Start: 2025-08-19 | End: 2025-08-19

## 2025-08-19 RX ORDER — KETOROLAC TROMETHAMINE 10 MG/1
10 TABLET, FILM COATED ORAL EVERY 8 HOURS PRN
Qty: 15 TABLET | Refills: 0 | Status: SHIPPED | OUTPATIENT
Start: 2025-08-19 | End: 2025-08-24

## 2025-08-19 RX ORDER — KETOROLAC TROMETHAMINE 30 MG/ML
60 INJECTION, SOLUTION INTRAMUSCULAR; INTRAVENOUS ONCE
Status: COMPLETED | OUTPATIENT
Start: 2025-08-19 | End: 2025-08-19

## 2025-08-19 RX ADMIN — METAXALONE 800 MG: 800 TABLET ORAL at 22:28

## 2025-08-19 RX ADMIN — HYDROCODONE BITARTRATE AND ACETAMINOPHEN 1 TABLET: 5; 325 TABLET ORAL at 22:28

## 2025-08-19 RX ADMIN — KETOROLAC TROMETHAMINE 60 MG: 60 INJECTION, SOLUTION INTRAMUSCULAR at 22:59

## 2025-08-19 ASSESSMENT — PAIN - FUNCTIONAL ASSESSMENT
PAIN_FUNCTIONAL_ASSESSMENT: 0-10

## 2025-08-19 ASSESSMENT — PAIN SCALES - GENERAL
PAINLEVEL_OUTOF10: 10
PAINLEVEL_OUTOF10: 4

## 2025-08-20 DIAGNOSIS — M32.9 SLE (SYSTEMIC LUPUS ERYTHEMATOSUS RELATED SYNDROME) (HCC): ICD-10-CM

## 2025-08-20 DIAGNOSIS — M79.7 FIBROMYALGIA: ICD-10-CM

## 2025-08-20 RX ORDER — TIZANIDINE 2 MG/1
TABLET ORAL
Qty: 60 TABLET | Refills: 0 | Status: SHIPPED | OUTPATIENT
Start: 2025-08-20